# Patient Record
Sex: FEMALE | Race: WHITE | NOT HISPANIC OR LATINO | Employment: OTHER | URBAN - METROPOLITAN AREA
[De-identification: names, ages, dates, MRNs, and addresses within clinical notes are randomized per-mention and may not be internally consistent; named-entity substitution may affect disease eponyms.]

---

## 2017-01-23 DIAGNOSIS — M35.00 SJOGREN'S DISEASE: ICD-10-CM

## 2017-01-23 RX ORDER — HYDROXYCHLOROQUINE SULFATE 200 MG/1
TABLET, FILM COATED ORAL
Qty: 60 TABLET | Refills: 0 | Status: SHIPPED | OUTPATIENT
Start: 2017-01-23 | End: 2017-05-16 | Stop reason: SDUPTHER

## 2017-01-23 RX ORDER — GABAPENTIN 100 MG/1
CAPSULE ORAL
Qty: 90 CAPSULE | Refills: 0 | Status: SHIPPED | OUTPATIENT
Start: 2017-01-23 | End: 2017-03-08 | Stop reason: SDUPTHER

## 2017-03-09 RX ORDER — GABAPENTIN 100 MG/1
100 CAPSULE ORAL 3 TIMES DAILY
Qty: 90 CAPSULE | Refills: 6 | Status: SHIPPED | OUTPATIENT
Start: 2017-03-09 | End: 2017-11-08 | Stop reason: SDUPTHER

## 2017-03-29 ENCOUNTER — OFFICE VISIT (OUTPATIENT)
Dept: PODIATRY | Facility: CLINIC | Age: 62
End: 2017-03-29
Payer: COMMERCIAL

## 2017-03-29 VITALS — BODY MASS INDEX: 40 KG/M2 | HEIGHT: 61 IN | WEIGHT: 211.88 LBS

## 2017-03-29 DIAGNOSIS — L60.3 NAIL DYSTROPHY: ICD-10-CM

## 2017-03-29 DIAGNOSIS — M21.6X9 EQUINUS DEFORMITY OF FOOT: ICD-10-CM

## 2017-03-29 DIAGNOSIS — M72.2 PLANTAR FASCIITIS: Primary | ICD-10-CM

## 2017-03-29 PROCEDURE — 87102 FUNGUS ISOLATION CULTURE: CPT

## 2017-03-29 PROCEDURE — 99999 PR PBB SHADOW E&M-EST. PATIENT-LVL III: CPT | Mod: PBBFAC,,, | Performed by: PODIATRIST

## 2017-03-29 PROCEDURE — 99204 OFFICE O/P NEW MOD 45 MIN: CPT | Mod: S$GLB,,, | Performed by: PODIATRIST

## 2017-03-29 PROCEDURE — 1160F RVW MEDS BY RX/DR IN RCRD: CPT | Mod: S$GLB,,, | Performed by: PODIATRIST

## 2017-03-29 NOTE — PROGRESS NOTES
Subjective:      Patient ID: Nesha Felix is a 61 y.o. female.    Chief Complaint: Heel Pain (x 2 weeks Rt. foot) and Nail Problem (fungus)  Patient presents to clinic with the chief complaint of Rt. Heel pain with an onset of symptoms x 3 months.  States the heel as been more symptomatic the past two weeks.  Describes pain as throbbing and currently rates pain as a 4/10.  States symptoms are exacerbated with prolonged standing and initial weight bearing.  Symptoms are alleviated partially alleviated with rest.  Has been icing, stretching, and wearing supportive shoes with no relief.  Denies trauma to the affected heel.  Also, relates concern regarding discoloration of several toenails of bilateral lower extremity. Denies experiencing pain from the nails.  Inquires as to possible treatment options for fungal infection.  Denies any additional pedal complaints.      Past Medical History:   Diagnosis Date    Acid reflux     Arthritis     Cancer 2012    surgery 4/2012 most recent check 4/2016 clear.        Past Surgical History:   Procedure Laterality Date    APPENDECTOMY      BREAST SURGERY      CATARACT EXTRACTION  2015       Family History   Problem Relation Age of Onset    Cancer Mother     Heart disease Father     No Known Problems Sister     No Known Problems Brother     No Known Problems Daughter     No Known Problems Son     Cancer Maternal Aunt     Heart disease Maternal Aunt     Rheum arthritis Paternal Aunt     No Known Problems Sister     No Known Problems Sister     Osteoarthritis Sister     Lupus Sister        Social History     Social History    Marital status:      Spouse name: N/A    Number of children: N/A    Years of education: N/A     Social History Main Topics    Smoking status: Never Smoker    Smokeless tobacco: None    Alcohol use None      Comment: wine once a week    Drug use: No    Sexual activity: Not Currently     Other Topics Concern    None      Social History Narrative       Current Outpatient Prescriptions   Medication Sig Dispense Refill    CRESTOR 20 mg tablet TK 1 T PO QD  0    gabapentin (NEURONTIN) 100 MG capsule Take 1 capsule (100 mg total) by mouth 3 (three) times daily. 90 capsule 6    ibuprofen (ADVIL,MOTRIN) 800 MG tablet TK 1 T PO  TID  3    furosemide (LASIX) 20 MG tablet 1 tablet once daily.  1    hydroxychloroquine (PLAQUENIL) 200 mg tablet TAKE 1 TABLET BY MOUTH TWICE DAILY 60 tablet 0    hydrOXYzine HCl (ATARAX) 10 MG Tab Take 3 tablets (30 mg total) by mouth 2 (two) times daily as needed. May cause drowsiness. 30 tablet 2    omeprazole (PRILOSEC) 40 MG capsule TK 1 C PO QD  2     No current facility-administered medications for this visit.        Review of patient's allergies indicates:   Allergen Reactions    Estrace [estradiol] Rash     Facial rash    Lipitor [atorvastatin] Rash     Facial rash         Review of Systems   Constitution: Negative for chills and fever.   Cardiovascular: Positive for leg swelling. Negative for claudication.   Skin: Positive for color change, dry skin and nail changes.   Musculoskeletal: Positive for joint swelling.   Neurological: Negative for numbness and paresthesias.   Psychiatric/Behavioral: Negative for altered mental status.           Objective:      Physical Exam   Constitutional: She is oriented to person, place, and time. She appears well-developed and well-nourished. No distress.   Cardiovascular:   Pulses:       Dorsalis pedis pulses are 2+ on the right side, and 2+ on the left side.        Posterior tibial pulses are 2+ on the right side, and 2+ on the left side.   CFT <3 seconds bilateral.  Pedal hair growth present bilateral.  Varicosities noted bilateral.  Mild nonpitting edema noted bilateral.  Toes are warm to touch bilateral.    Musculoskeletal: She exhibits edema and tenderness.   Muscle strength 5/5 in all muscle groups bilateral.  No tenderness nor crepitation with ROM of  foot/ankle joints bilateral.  Pain with palpation of the Rt. Medial calcaneal tubercle.  (-) for pain with medial and lateral compression of the heel.  Bilateral pes cavus foot type.  Bilateral gastrocnemius equinus.  Semi-reducible contracture of toes 2-5 on the Rt and toes 3-5 on the Lt.  Rectus alignment of toes 1-2 on the Lt.    Neurological: She is alert and oriented to person, place, and time. She has normal strength. No sensory deficit.   Light touch intact bilateral.   Skin: Skin is warm, dry and intact. No abrasion, no bruising, no burn, no ecchymosis, no laceration, no lesion, no petechiae and no rash noted. She is not diaphoretic. No cyanosis or erythema. No pallor. Nails show no clubbing.   Pedal skin has normal turgor, temperature, and texture bilateral.  Bilateral hallux and Rt. 2nd toenail appear slightly thickened, and discolored with no evidence of subungual debris.  Superficial white discoloration noted to the nail plate.  Remaining toenails x 7 appear normotrophic. Examination of the skin reveals no evidence of significant maceration, rashes, open lesions, suspicious appearing nevi or other concerning lesions.              Assessment:       Encounter Diagnoses   Name Primary?    Plantar fasciitis Yes    Equinus deformity of foot     Nail dystrophy          Plan:       Nesha was seen today for heel pain and nail problem.    Diagnoses and all orders for this visit:    Plantar fasciitis  -     Ambulatory consult to Physical Therapy    Equinus deformity of foot  -     Ambulatory consult to Physical Therapy    Nail dystrophy  -     CULTURE, FUNGUS      I counseled the patient on her conditions, their implications and medical management.    With the patient's verbal consent, a sterile nail nipper was used to obtain samples of bilateral hallux nail plate without incident.  Patient tolerated this quite well.    Orders written for toenail culture to determine if fungal elements are present.    Will  consider new line of treatment for possible fungal infection should cultures be positive.    Advised to continue performing stretching exercises daily to reduce bilateral equinus.    Recommended wearing supportive shoes and OTC insoles to offload the affected heel.    Advised to rest and ice the affected heel up to 20 minutes daily.    Discussed avoidance of barefoot walking, flats, and slippers as this will exacerbate symptoms.    Discussed avoidance of squatting, stooping, and running as these activities will exacerbate symptoms.      Referral written for Physical Therapy for possible Astym of the heel.    RTC in 6 weeks for follow up.    Phani Garvey DPM

## 2017-03-29 NOTE — PATIENT INSTRUCTIONS
- Given verbal and written instructions regarding stretching exercises to help reduce equinus deformity.    - Sof Sole Fit Series High Arch (found on [employer]).     - Recommended wearing supportive shoes and insoles to help provide better medial longitudinal arch support and to offload the affected heel.    - Rest and ice the affected heel daily.    - Avoid barefoot walking, flats, and slippers as this will exacerbate symptoms.    - Avoid squatting, stooping, and running as these activities will exacerbate symptoms.

## 2017-03-29 NOTE — MR AVS SNAPSHOT
"    [facility]  [address] [facility] [address]  [place] [address]  Phone: [phone]                  Nesha Felix   3/29/2017 2:20 PM   Office Visit    Description:  Female : 1955   Provider:  Phani Garvey DPM   Department:  [facility]           Reason for Visit     Heel Pain     Nail Problem           Diagnoses this Visit        Comments    Plantar fasciitis    -  Primary     Equinus deformity of foot                To Do List           Future Appointments        Provider Department Dept Phone    5/10/2017 3:20 PM Phani Garvey DPM [facility] [phone]      Goals (5 Years of Data)     None      Follow-Up and Disposition     Return in about 6 weeks (around 5/10/2017).      [facility]     [facility] On [facility] Nurse Care Line -  Assistance  Registered nurses in the [facility] provide clinical advisement, health education, appointment booking, and other advisory services.  Call for this free service at [phone].             Medications           STOP taking these medications     VICTOZA 3-DENNIS 0.6 mg/0.1 mL (18 mg/3 mL) PnIj ADM 1.8 MG SC QD    BD ULTRA-FINE LASHAUN PEN NEEDLES 32 gauge x 5/32" Ndle U ONCE D TO INJECT VICTOZA           Verify that the below list of medications is an accurate representation of the medications you are currently taking.  If none reported, the list may be blank. If incorrect, please contact your healthcare provider. Carry this list with you in case of emergency.           Current Medications     CRESTOR 20 mg tablet TK 1 T PO QD    gabapentin (NEURONTIN) 100 MG capsule Take 1 capsule (100 mg total) by mouth 3 (three) times daily.    ibuprofen (ADVIL,MOTRIN) 800 MG tablet TK 1 T PO  TID    furosemide (LASIX) 20 MG tablet 1 tablet once daily.    hydroxychloroquine (PLAQUENIL) 200 mg tablet TAKE 1 TABLET BY MOUTH TWICE DAILY    hydrOXYzine HCl (ATARAX) 10 MG Tab Take 3 tablets (30 mg total) by mouth 2 (two) times daily as " "needed. May cause drowsiness.    omeprazole (PRILOSEC) 40 MG capsule TK 1 C PO QD           Clinical Reference Information           Your Vitals Were     Height Weight BMI          5' 1" (1.549 m) 96.1 kg (211 lb 13.8 oz) 40.03 kg/m2        Allergies as of 3/29/2017     Estrace [Estradiol]    Lipitor [Atorvastatin]      Immunizations Administered on Date of Encounter - 3/29/2017     None      Orders Placed During Today's Visit      Normal Orders This Visit    Ambulatory consult to Physical Therapy       Instructions    - Given verbal and written instructions regarding stretching exercises to help reduce equinus deformity.    - Sof Sole Fit Series High Arch (found on [employer]).     - Recommended wearing supportive shoes and insoles to help provide better medial longitudinal arch support and to offload the affected heel.    - Rest and ice the affected heel daily.    - Avoid barefoot walking, flats, and slippers as this will exacerbate symptoms.    - Avoid squatting, stooping, and running as these activities will exacerbate symptoms.           Language Assistance Services     ATTENTION: Language assistance services are available, free of charge. Please call [phone].      ATENCIÓN: Si habla neil, tiene a bravo disposición servicios gratuitos de asistencia lingüística. Llame al [phone].     ТАТЬЯНА Ý: N?u b?n nói Ti?ng Vi?t, có các d?ch v? h? tr? ngôn ng? mi?n phí dành cho b?n. G?i s? [phone].         [facility] complies with applicable Federal civil rights laws and does not discriminate on the basis of race, color, national origin, age, disability, or sex.        "

## 2017-04-05 ENCOUNTER — CLINICAL SUPPORT (OUTPATIENT)
Dept: REHABILITATION | Facility: HOSPITAL | Age: 62
End: 2017-04-05
Attending: PODIATRIST
Payer: COMMERCIAL

## 2017-04-05 DIAGNOSIS — M72.2 PLANTAR FASCIITIS: ICD-10-CM

## 2017-04-05 DIAGNOSIS — M21.6X9 EQUINUS DEFORMITY OF FOOT: ICD-10-CM

## 2017-04-05 PROCEDURE — 97161 PT EVAL LOW COMPLEX 20 MIN: CPT | Mod: PN

## 2017-04-05 PROCEDURE — 97140 MANUAL THERAPY 1/> REGIONS: CPT | Mod: PN

## 2017-04-05 NOTE — PROGRESS NOTES
Physical Therapy Evaluation    Name: Nesha [facility] Number: [id number]        Diagnosis:   Encounter Diagnoses   Name Primary?    Plantar fasciitis     Equinus deformity of foot      Physician: Phani Garvey DPM  Treatment Orders: PT Eval and Treat    Past Medical History:   Diagnosis Date    Acid reflux     Arthritis     Cancer 2012    surgery 4/2012 most recent check 4/2016 clear.      Current Outpatient Prescriptions   Medication Sig    CRESTOR 20 mg tablet TK 1 T PO QD    furosemide (LASIX) 20 MG tablet 1 tablet once daily.    gabapentin (NEURONTIN) 100 MG capsule Take 1 capsule (100 mg total) by mouth 3 (three) times daily.    hydroxychloroquine (PLAQUENIL) 200 mg tablet TAKE 1 TABLET BY MOUTH TWICE DAILY    hydrOXYzine HCl (ATARAX) 10 MG Tab Take 3 tablets (30 mg total) by mouth 2 (two) times daily as needed. May cause drowsiness.    ibuprofen (ADVIL,MOTRIN) 800 MG tablet TK 1 T PO  TID    omeprazole (PRILOSEC) 40 MG capsule TK 1 C PO QD     No current facility-administered medications for this visit.      Review of patient's allergies indicates:   Allergen Reactions    Estrace [estradiol] Rash     Facial rash    Lipitor [atorvastatin] Rash     Facial rash     Precautions: bladder CA 2012    Evaluation Date: 4/5/17      Subjective       Onset Date: R heel pain since October 2017 after R foot sx and wearing CAM boot.  She states the night splint helps.  Sx in 10/2016 to R foot and sx in 12/2016 to L foot at [facility]  Pt also reports new onset of sciatica pain    Prior Level of Function: independent with all ADLs  Social History: pt lives in 1 story house with 1 step to enter  Occupation: office work (sitting at computer)      History of Present Illness: Nesha is a 61 y.o. female that presents to [facility] secondary to R heel pain.      Pain: current 2/10, worst 10/10, best 2/10, Throbbing, intermittent    Aggravating factors: initial step in AM, prolonged  standing/walking  Easing factors: night splint, stretches, Tylenol    Pts goals: be able to walk without pain        Objective     Observation: pt is pleasant and cooperative    Lower Extremity Strength  Right LE  Left LE    Knee extension: 5/5 Knee extension: 5/5   Knee flexion: 5/5 Knee flexion: 5/5   Hip flexion: 4+/5 Hip flexion: 4+/5   Hip Internal Rotation:  5/5    Hip Internal Rotation: 5/5      Hip External Rotation: 4+/5    Hip External Rotation: 4+/5      Hip extension:  4+/5 Hip extension: 4+/5   Hip abduction: 4+/5 Hip abduction: 4/5   Hip adduction: 4/5 Hip adduction 4+/5     Ankle Range of Motion: AROM   Ankle Left Right   Dorsiflexion 3  -4    Plantarflexion 67  55    Inversion 20  20    Eversion 12  5        Strength:  Ankle Left Right   Dorsiflexion 5/5 5/5   Plantarflexion 4/5 4/5   Inversion 5/5 5/5   Eversion 5/5 4+/5       Palpation: TTP to R heel and medial arch    Sensation: intact to light touch    FOTO: mobility  CMS Impairment/Limitation/Restriction for FOTO Foot Survey  Status Limitation G-Code CMS Severity Modifier  Intake 34% 66% Current Status CL - At least 60 percent but less than 80 percent  Predicted 53% 47% Goal Status+ CK - At least 40 percent but less than 60 percent      PT Evaluation Completed? Yes  Discussed Plan of Care with patient: Yes    TREATMENT:  Nesha instructed in and performed therapeutic exercises to develop strength and endurance, flexibility for 10 minutes including:  gastroc stretch stand 3x20 sec  Soleus stretch stand 3x20 sec    Nesha  received the following manual therapy techniques x 5 min to R heel and plantar surface of foot    Pt rec'd kinesiotaping to R foot for fascia correction (platar fascia) 100% tension  Pt educated on precautions, wear time and proper tape removal.  Pt gave verbal understanding.       HEP provided: pt given verbal and written instruction for all ex listed above.  Pt educated on stretching at work and ice massage at end of  day.  Pt gave verbal understanding.  Instructed pt. Regarding: Proper technique with all exercises. Pt demo good understanding of the education provided. Nesha demonstrated good return demonstration of activities.        Assessment     This is a 61 y.o. female referred to outpatient physical therapy and presents with a medical diagnosis of   Plantar fasciitis   Equinus deformity of foot    and demonstrates limitations as described in the problem list.  Pt will benefit from physcial therapy services in order to maximize pain free and/or functional use of right foot. The following goals were discussed with the patient and patient is in agreement with them as to be addressed in the treatment plan.     History  Co-morbidities and personal factors that may impact the plan of care Examination  Body Structures and Functions, activity limitations and participation restrictions that may impact the plan of care Clinical Presentation   Decision Making/ Complexity Score   Co-morbidities:     Sx in 10/2016 to R foot and sx in 12/2016 to L foot            Personal Factors:    Body Regions: R foot, R LE    Body Systems:     Decreased R ankle ROM  Decreased B LE strength        Activity limitations:   Standing, walking    Participation Restrictions:          stable low       Medical necessity is demonstrated by the following IMPAIRMENTS/PROBLEM LIST:     1)Increase in R heel pain level limiting function   2)decreased R ankle ROM   3)decreased LE strength   4)gait abnormality   5)Lack of HEP    Anticipated Barriers for physical therapy: R sciatica symptoms    GOALS: Short Term Goals:  4 weeks  1.Report decreased    R heel    pain  <   / =  5  /10  to increase tolerance for walking  2. Increased R ankle DF AROM to 0 deg for increased ease with ambulation  3. Increased B LE strength by 1/3 muscle grade in all deficient planes  to increase tolerance for ADL and work activities.  4. Pt to report compliance with shoe inserts and ice  massage to R heel  5. Pt to tolerate HEP to improve ROM and independence with ADL's    Long Term Goals: 8 weeks  1.Report decreased    R heel    pain  <   / =  2  /10  to increase tolerance for walking  2. Increased R ankle DF AROM to 2 deg for increased ease with ambulation  3. Increased B LE strength by 1 muscle grade in all deficient planes  to increase tolerance for ADL and work activities.  4. 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 Pt to tolerate HEP to improve ROM and independence with ADL's      Plan     Pt will be treated by physical therapy 1-3 times a week for 8 weeks for Pt Education, HEP, therapeutic exercises, neuromuscular re-education, joint mobilizations, modalities prn to achieve established goals. Nesha may at times be seen by a PTA as part of the Rehab Team.     Cont PT for  8   weeks.     I certify the need for these services furnished under this plan of treatment and while under my care.______________________________ Physician/Referring Practitioner  Date of Signature

## 2017-04-05 NOTE — PLAN OF CARE
Physical Therapy Evaluation    Name: [facility] Number: [id number]        Diagnosis:   Encounter Diagnoses   Name Primary?    Plantar fasciitis     Equinus deformity of foot      Physician: Phani Garvey DPM  Treatment Orders: PT Eval and Treat    Past Medical History:   Diagnosis Date    Acid reflux     Arthritis     Cancer 2012    surgery 4/2012 most recent check 4/2016 clear.      Current Outpatient Prescriptions   Medication Sig    CRESTOR 20 mg tablet TK 1 T PO QD    furosemide (LASIX) 20 MG tablet 1 tablet once daily.    gabapentin (NEURONTIN) 100 MG capsule Take 1 capsule (100 mg total) by mouth 3 (three) times daily.    hydroxychloroquine (PLAQUENIL) 200 mg tablet TAKE 1 TABLET BY MOUTH TWICE DAILY    hydrOXYzine HCl (ATARAX) 10 MG Tab Take 3 tablets (30 mg total) by mouth 2 (two) times daily as needed. May cause drowsiness.    ibuprofen (ADVIL,MOTRIN) 800 MG tablet TK 1 T PO  TID    omeprazole (PRILOSEC) 40 MG capsule TK 1 C PO QD     No current facility-administered medications for this visit.      Review of patient's allergies indicates:   Allergen Reactions    Estrace [estradiol] Rash     Facial rash    Lipitor [atorvastatin] Rash     Facial rash     Precautions: bladder CA 2012    Evaluation Date: 4/5/17      Subjective       Onset Date: R heel pain since October 2017 after R foot sx and wearing CAM boot.  She states the night splint helps.  Sx in 10/2016 to R foot and sx in 12/2016 to L foot at [facility]  Pt also reports new onset of sciatica pain    Prior Level of Function: independent with all ADLs  Social History: pt lives in 1 story house with 1 step to enter  Occupation: office work (sitting at computer)      History of Present Illness: Nesha is a 61 y.o. female that presents to [facility] secondary to R heel pain.      Pain: current 2/10, worst 10/10, best 2/10, Throbbing, intermittent    Aggravating factors: initial step in AM, prolonged  standing/walking  Easing factors: night splint, stretches, Tylenol    Pts goals: be able to walk without pain        Objective     Observation: pt is pleasant and cooperative    Lower Extremity Strength  Right LE  Left LE    Knee extension: 5/5 Knee extension: 5/5   Knee flexion: 5/5 Knee flexion: 5/5   Hip flexion: 4+/5 Hip flexion: 4+/5   Hip Internal Rotation:  5/5    Hip Internal Rotation: 5/5      Hip External Rotation: 4+/5    Hip External Rotation: 4+/5      Hip extension:  4+/5 Hip extension: 4+/5   Hip abduction: 4+/5 Hip abduction: 4/5   Hip adduction: 4/5 Hip adduction 4+/5     Ankle Range of Motion: AROM   Ankle Left Right   Dorsiflexion 3  -4    Plantarflexion 67  55    Inversion 20  20    Eversion 12  5        Strength:  Ankle Left Right   Dorsiflexion 5/5 5/5   Plantarflexion 4/5 4/5   Inversion 5/5 5/5   Eversion 5/5 4+/5       Palpation: TTP to R heel and medial arch    Sensation: intact to light touch    FOTO: mobility  CMS Impairment/Limitation/Restriction for FOTO Foot Survey  Status Limitation G-Code CMS Severity Modifier  Intake 34% 66% Current Status CL - At least 60 percent but less than 80 percent  Predicted 53% 47% Goal Status+ CK - At least 40 percent but less than 60 percent      PT Evaluation Completed? Yes  Discussed Plan of Care with patient: Yes    TREATMENT:  Nesha instructed in and performed therapeutic exercises to develop strength and endurance, flexibility for 10 minutes including:  gastroc stretch stand 3x20 sec  Soleus stretch stand 3x20 sec    Nesha  received the following manual therapy techniques x 5 min to R heel and plantar surface of foot    Pt rec'd kinesiotaping to R foot for fascia correction (platar fascia) 100% tension  Pt educated on precautions, wear time and proper tape removal.  Pt gave verbal understanding.       HEP provided: pt given verbal and written instruction for all ex listed above.  Pt educated on stretching at work and ice massage at end of  day.  Pt gave verbal understanding.  Instructed pt. Regarding: Proper technique with all exercises. Pt demo good understanding of the education provided. Nesha demonstrated good return demonstration of activities.        Assessment     This is a 61 y.o. female referred to outpatient physical therapy and presents with a medical diagnosis of   Plantar fasciitis   Equinus deformity of foot    and demonstrates limitations as described in the problem list.  Pt will benefit from physcial therapy services in order to maximize pain free and/or functional use of right foot. The following goals were discussed with the patient and patient is in agreement with them as to be addressed in the treatment plan.     History  Co-morbidities and personal factors that may impact the plan of care Examination  Body Structures and Functions, activity limitations and participation restrictions that may impact the plan of care Clinical Presentation   Decision Making/ Complexity Score   Co-morbidities:     Sx in 10/2016 to R foot and sx in 12/2016 to L foot            Personal Factors:    Body Regions: R foot, R LE    Body Systems:     Decreased R ankle ROM  Decreased B LE strength        Activity limitations:   Standing, walking    Participation Restrictions:          stable low       Medical necessity is demonstrated by the following IMPAIRMENTS/PROBLEM LIST:     1)Increase in R heel pain level limiting function   2)decreased R ankle ROM   3)decreased LE strength   4)gait abnormality   5)Lack of HEP    Anticipated Barriers for physical therapy: R sciatica symptoms    GOALS: Short Term Goals:  4 weeks  1.Report decreased    R heel    pain  <   / =  5  /10  to increase tolerance for walking  2. Increased R ankle DF AROM to 0 deg for increased ease with ambulation  3. Increased B LE strength by 1/3 muscle grade in all deficient planes  to increase tolerance for ADL and work activities.  4. Pt to report compliance with shoe inserts and ice  massage to R heel  5. Pt to tolerate HEP to improve ROM and independence with ADL's    Long Term Goals: 8 weeks  1.Report decreased    R heel    pain  <   / =  2  /10  to increase tolerance for walking  2. Increased R ankle DF AROM to 2 deg for increased ease with ambulation  3. Increased B LE strength by 1 muscle grade in all deficient planes  to increase tolerance for ADL and work activities.  4. 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 Pt to tolerate HEP to improve ROM and independence with ADL's      Plan     Pt will be treated by physical therapy 1-3 times a week for 8 weeks for Pt Education, HEP, therapeutic exercises, neuromuscular re-education, joint mobilizations, modalities prn to achieve established goals. Nesha may at times be seen by a PTA as part of the Rehab Team.     Cont PT for  8   weeks.     I certify the need for these services furnished under this plan of treatment and while under my care.______________________________ Physician/Referring Practitioner  Date of Signature

## 2017-04-10 ENCOUNTER — CLINICAL SUPPORT (OUTPATIENT)
Dept: REHABILITATION | Facility: HOSPITAL | Age: 62
End: 2017-04-10
Attending: PODIATRIST
Payer: COMMERCIAL

## 2017-04-10 DIAGNOSIS — M72.2 PLANTAR FASCIITIS: ICD-10-CM

## 2017-04-10 DIAGNOSIS — M21.6X9 EQUINUS DEFORMITY OF FOOT: ICD-10-CM

## 2017-04-10 PROCEDURE — 97110 THERAPEUTIC EXERCISES: CPT | Mod: PN

## 2017-04-10 NOTE — PROGRESS NOTES
Name: Nesha Bhagat [facility] Number: [id number]  Date of Treatment: 04/10/2017   Diagnosis:   Encounter Diagnoses   Name Primary?    Plantar fasciitis     Equinus deformity of foot        Time in: 4:10  Time Out: 5:00  Total Treatment Time: 35      Subjective:    Nesha reports improvement of symptoms.  Patient reports her R foot pain to be 0/10 on a 0-10 scale with 0 being no pain and 10 being the worst pain imaginable.  She states she thinks the kinesiotaping helped decreased her R foot pain.  She states she has been taking a steroid pack for her back and has not had any pain since.    Objective    Nesha instructed in therapeutic exercises to develop strength, endurance, ROM and flexibility for 35 minutes including:   Recumbent bike x10 min level 1  gastroc stretch stand 3x20 sec  Soleus stretch incline 3x20 sec  SLR x10  Hip abd SL x10  Hip add SL x10  Hip ext prone x10  R piriformis stretch supine 3x20 sec    Pt rec'd kinesiotaping to R foot for fascia correction (platar fascia) 100% tension  Pt educated on precautions, wear time and proper tape removal.  Pt gave verbal understanding.  Written Home Exercises Provided: pt instructed to continue previous HEP  Pt demo good understanding of the education provided. Nesha demonstrated good return demonstration of activities.     Assessment:   Pt tolerated tx session well reporting 0/10 pain post tx.  She will continue to benefit from KT taping for plantar fascitis.    Pt will continue to benefit from skilled PT intervention. Medical Necessity is demonstrated by:  Pain limits function of effected part for some activities, Unable to participate fully in daily activities, Requires skilled supervision to complete and progress HEP and Weakness.    Patient is making good progress towards established goals.      Plan:  Continue with established Plan of Care towards PT goals. Perform eval for low back pain next visit.

## 2017-04-12 ENCOUNTER — CLINICAL SUPPORT (OUTPATIENT)
Dept: REHABILITATION | Facility: HOSPITAL | Age: 62
End: 2017-04-12
Attending: PODIATRIST
Payer: COMMERCIAL

## 2017-04-12 DIAGNOSIS — M21.6X9 EQUINUS DEFORMITY OF FOOT: ICD-10-CM

## 2017-04-12 DIAGNOSIS — M72.2 PLANTAR FASCIITIS: ICD-10-CM

## 2017-04-12 PROCEDURE — 97164 PT RE-EVAL EST PLAN CARE: CPT | Mod: PN

## 2017-04-12 PROCEDURE — 97110 THERAPEUTIC EXERCISES: CPT | Mod: PN

## 2017-04-12 NOTE — PROGRESS NOTES
Name: Nesha Bhagat [facility] Number: [id number]  Date of Treatment: 04/12/2017   Diagnosis:   Encounter Diagnoses   Name Primary?    Plantar fasciitis     Equinus deformity of foot        Time in: 4:10  Time Out: 5:00  Total Treatment Time: 35       Subjective: Pt with new PT order for low back pain    Nesha reports improvement of symptoms.  Patient reports her R foot pain to be 0/10 on a 0-10 scale with 0 being no pain and 10 being the worst pain imaginable.  She states she thinks the kinesiotaping helped decreased her R foot pain.  She states she has been taking a steroid pack for her back and has not had any pain since.    Objective  Lumbar     Subjective   Onset/EMILI: pt reports pain to low back for years, pain to lateral R thigh and groin  Primary concern/ Chief complaints: pain to R low back and down lateral R thigh and groin    Nesha is a 61 y.o. female that presents to [facility] outpatient physical therapy secondary to low back and R LE.    Previous treatment included injections and steroid pack. Pt works: office work (sitting at desk). Pt denies numbness and tingling to LE. No cultural, environmental, or spiritual barriers identified to treatment or learning.      Pain Scale: Nesha rates pain on a scale of 0-10 to be 10 at worst; 4 currently; 0 at best .  Increased pain: standing, prolonged sitting, exercise  Decreased pain: steroids    Patient Goals: Pt would like to decrease pain and increase function so she can return to her normal daily activities.        Objective     Observation: pt in apparent pain    Posture:  Fwd head, rounded     Lumbar Range of Motion:    % Pain   Flexion 100   no        Extension 90   no        Left Side Bending 80 Tight to R side        Right Side Bending 90 no        Left rotation   75 Tight on L        Right Rotation   75 no             Neuro Dynamic Testing:    Sciatic nerve:      SLR: - bilaterally        Joint Mobility: pain with PA to L4 and L5 with slight  decreased motion and muscle guarding noted     Palpation: TTP to B lumbar paraspinals and R lateral piriformis, TTP to TVP of L4 and L5    Sensation: intact to light touch    Flexibility:    Ely's test: R = 90 degrees ; L = 100 degrees   Popliteal Angle: R = -5 degrees ; L = -10 degrees     Functional Limitations Reports - G Codes  Category: mobility  CMS Impairment/Limitation/Restriction for FOTO Lumbar Spine Survey  Status Limitation G-Code CMS Severity Modifier  Intake 45% 55% Current Status CK - At least 40 percent but less than 60 percent  Predicted 57% 43% Goal Status+ CK - At least 40 percent but less than 60 percent      TREATMENT:  Nesha instructed in and performed therapeutic exercises to develop strength and endurance, flexibility for 30 minutes including:  SKTC 3x20 sec  LTR 3x20 sec  Piriformis stretch supine 3x20 sec  PPT with TA set 10x5 sec  Seated piriformis stretch 3x20 sec  Seated HS stretch 3x20 sec    Pt educated on proper sitting posture with use of towel for lumbar roll    Nesha  received the following manual therapy techniques x 5 min. To include laminar release to B lumbar paraspinals and instructed to continue LE HEP from previous appt.  Instructed pt. Regarding: Proper technique with all exercises. Pt demo good understanding of the education provided. Nesha demonstrated good return demonstration of activities.      GOALS: Short Term Goals:  4 weeks  1.Report decreased    Low back and R LE    pain  <   / =  4  /10  to increase tolerance for standing/walking  2. Pt to increase B popliteal angle by > or = 3 degrees in order to improve flexibility and posture.   3. Increased R LE strength by 1/3 muscle grade in all deficient planes to increase tolerance for ADL and work activities.  4. Increased L LE strength by 1/3 muscle grade in all deficient planes to increase tolerance for ADL and work activities.  5. Pt to increase B Ely's Test by > or = 5 degrees in order to improve  flexibility and posture.   6. Pt to tolerate HEP to improve ROM and independence with ADL's  7. Pt will report ability to walk x10 min without exacerbation of back pain.      Long Term Goals: 8 weeks  1.Report decreased    Low back and R LE    pain  <   / =  2  /10  to increase tolerance for standing/walking  2. Pt to increase B popliteal angle by > or = 4 degrees in order to improve flexibility and posture.   3. Increased R LE strength by 1 muscle grade in all deficient planes to increase tolerance for ADL and work activities.  4. Increased L LE strength by 1 muscle grade in all deficient planes to increase tolerance for ADL and work activities.  5. Pt to increase B Ely's Test by > or =  10degrees in order to improve flexibility and posture.   6. Pt will demonstrate understanding of proper body mechanics for lifting objects to/from waist.      Plan     Pt will be treated by physical therapy 1-3 times a week for 8 weeks for Pt Education, HEP, therapeutic exercises, neuromuscular re-education, joint mobilizations, modalities prn to achieve established goals. Nesha may at times be seen by a PTA as part of the Rehab Team.     Cont PT for 8 weeks.     I certify the need for these services furnished under this plan of treatment and while under my care.______________________________ Physician/Referring Practitioner  Date of Signature

## 2017-04-13 NOTE — PLAN OF CARE
Name: Nesha Bhagat [facility] Number: [id number]  Date of Treatment: 04/12/2017   Diagnosis:   Encounter Diagnoses   Name Primary?    Plantar fasciitis     Equinus deformity of foot        Time in: 4:10  Time Out: 5:00  Total Treatment Time: 35       Subjective: Pt with new PT order for low back pain    Nesha reports improvement of symptoms.  Patient reports her R foot pain to be 0/10 on a 0-10 scale with 0 being no pain and 10 being the worst pain imaginable.  She states she thinks the kinesiotaping helped decreased her R foot pain.  She states she has been taking a steroid pack for her back and has not had any pain since.    Objective  Lumbar     Subjective   Onset/EMILI: pt reports pain to low back for years, pain to lateral R thigh and groin  Primary concern/ Chief complaints: pain to R low back and down lateral R thigh and groin    Nesha is a 61 y.o. female that presents to [facility] outpatient physical therapy secondary to low back and R LE.    Previous treatment included injections and steroid pack. Pt works: office work (sitting at desk). Pt denies numbness and tingling to LE. No cultural, environmental, or spiritual barriers identified to treatment or learning.      Pain Scale: Nesha rates pain on a scale of 0-10 to be 10 at worst; 4 currently; 0 at best .  Increased pain: standing, prolonged sitting, exercise  Decreased pain: steroids    Patient Goals: Pt would like to decrease pain and increase function so she can return to her normal daily activities.        Objective     Observation: pt in apparent pain    Posture:  Fwd head, rounded     Lumbar Range of Motion:    % Pain   Flexion 100   no        Extension 90   no        Left Side Bending 80 Tight to R side        Right Side Bending 90 no        Left rotation   75 Tight on L        Right Rotation   75 no             Neuro Dynamic Testing:    Sciatic nerve:      SLR: - bilaterally        Joint Mobility: pain with PA to L4 and L5 with slight  decreased motion and muscle guarding noted     Palpation: TTP to B lumbar paraspinals and R lateral piriformis, TTP to TVP of L4 and L5    Sensation: intact to light touch    Flexibility:    Ely's test: R = 90 degrees ; L = 100 degrees   Popliteal Angle: R = -5 degrees ; L = -10 degrees     Functional Limitations Reports - G Codes  Category: mobility  CMS Impairment/Limitation/Restriction for FOTO Lumbar Spine Survey  Status Limitation G-Code CMS Severity Modifier  Intake 45% 55% Current Status CK - At least 40 percent but less than 60 percent  Predicted 57% 43% Goal Status+ CK - At least 40 percent but less than 60 percent      TREATMENT:  Nesha instructed in and performed therapeutic exercises to develop strength and endurance, flexibility for 30 minutes including:  SKTC 3x20 sec  LTR 3x20 sec  Piriformis stretch supine 3x20 sec  PPT with TA set 10x5 sec  Seated piriformis stretch 3x20 sec  Seated HS stretch 3x20 sec    Pt educated on proper sitting posture with use of towel for lumbar roll    Nesha  received the following manual therapy techniques x 5 min. To include laminar release to B lumbar paraspinals and instructed to continue LE HEP from previous appt.  Instructed pt. Regarding: Proper technique with all exercises. Pt demo good understanding of the education provided. Nesha demonstrated good return demonstration of activities.      GOALS: Short Term Goals:  4 weeks  1.Report decreased    Low back and R LE    pain  <   / =  4  /10  to increase tolerance for standing/walking  2. Pt to increase B popliteal angle by > or = 3 degrees in order to improve flexibility and posture.   3. Increased R LE strength by 1/3 muscle grade in all deficient planes to increase tolerance for ADL and work activities.  4. Increased L LE strength by 1/3 muscle grade in all deficient planes to increase tolerance for ADL and work activities.  5. Pt to increase B Ely's Test by > or = 5 degrees in order to improve  flexibility and posture.   6. Pt to tolerate HEP to improve ROM and independence with ADL's  7. Pt will report ability to walk x10 min without exacerbation of back pain.      Long Term Goals: 8 weeks  1.Report decreased    Low back and R LE    pain  <   / =  2  /10  to increase tolerance for standing/walking  2. Pt to increase B popliteal angle by > or = 4 degrees in order to improve flexibility and posture.   3. Increased R LE strength by 1 muscle grade in all deficient planes to increase tolerance for ADL and work activities.  4. Increased L LE strength by 1 muscle grade in all deficient planes to increase tolerance for ADL and work activities.  5. Pt to increase B Ely's Test by > or =  10degrees in order to improve flexibility and posture.   6. Pt will demonstrate understanding of proper body mechanics for lifting objects to/from waist.      Plan     Pt will be treated by physical therapy 1-3 times a week for 8 weeks for Pt Education, HEP, therapeutic exercises, neuromuscular re-education, joint mobilizations, modalities prn to achieve established goals. Nesha may at times be seen by a PTA as part of the Rehab Team.     Cont PT for 8 weeks.     I certify the need for these services furnished under this plan of treatment and while under my care.______________________________ Physician/Referring Practitioner  Date of Signature

## 2017-04-17 ENCOUNTER — CLINICAL SUPPORT (OUTPATIENT)
Dept: REHABILITATION | Facility: HOSPITAL | Age: 62
End: 2017-04-17
Attending: PODIATRIST
Payer: COMMERCIAL

## 2017-04-17 DIAGNOSIS — M72.2 PLANTAR FASCIITIS: ICD-10-CM

## 2017-04-17 DIAGNOSIS — M21.6X9 EQUINUS DEFORMITY OF FOOT: ICD-10-CM

## 2017-04-17 PROCEDURE — 97110 THERAPEUTIC EXERCISES: CPT | Mod: PN

## 2017-04-17 PROCEDURE — 97140 MANUAL THERAPY 1/> REGIONS: CPT | Mod: PN

## 2017-04-17 NOTE — PROGRESS NOTES
Name: Nesha Bhagat [facility] Number: [id number]  Date of Treatment: 04/17/2017   Diagnosis:   Encounter Diagnoses   Name Primary?    Plantar fasciitis     Equinus deformity of foot      Visit #: [id number]  Start Date: 04/05/17  POC End Date: 05/31/17    Time in: 4:05  Time Out: 4:58  Total Treatment Time: 50  Group Time: 20       SUBJECTIVE:     Nesha reports increased soreness in the gastroc with cramping and pain into the heel. No increased pain in the R low back. Patient reports her Low back pain at 1/10 R foot pain to be 8/10 on a 0-10 scale with 0 being no pain and 10 being the worst pain imaginable.      OBJECTIVE:    Observation: pt arrives with antalgic gait, decreased WB on R LE    TREATMENT:  Nesha instructed in and performed therapeutic exercises to develop strength and endurance, flexibility for 40 minutes (20 min 1:1) including:  SKTC 3x20 sec  LTR 3x20 sec  Piriformis stretch supine 3x20 sec  Supine nerve glide x 5 with 10 ankle pumps  PPT with TA set 30x5 sec  Seated piriformis stretch 3x20 sec  Seated HS stretch 3x20 sec  Standing B gastroc stretch on incline x 2 min    Pt rec'd kinesiotaping to R foot for fascia correction (platar fascia) 100% tension  Pt educated on precautions, wear time and proper tape removal. Pt gave verbal understanding.    Nesha  received the following manual therapy techniques x 10 min to include laminar release to B lumbar paraspinals, STM to R gastroc for decreased tightness at musculo-tendon junction.   Pt instructed to continue with current written HEP.  Instructed pt. Regarding: Proper technique with all exercises. Pt demo good understanding of the education provided. Nesha demonstrated good return demonstration of activities.    ASSESSMENT:    Pt demonstrated overall good tolerance to treatment session this date. She continues with core weakness, tightness of B hips and lumbar spine, as well as fascial tightness and tenderness of R gastroc. Tenderness at  calcaneus with heel strike during ambulation. She will benefit from continued strengthening and stretching as tolerated to improve tolerance to ADL's without exacerbation to symptoms.     GOALS: Short Term Goals:  4 weeks  1.Report decreased    Low back and R LE    pain  <   / =  4  /10  to increase tolerance for standing/walking  2. Pt to increase B popliteal angle by > or = 3 degrees in order to improve flexibility and posture.   3. Increased R LE strength by 1/3 muscle grade in all deficient planes to increase tolerance for ADL and work activities.  4. Increased L LE strength by 1/3 muscle grade in all deficient planes to increase tolerance for ADL and work activities.  5. Pt to increase B Ely's Test by > or = 5 degrees in order to improve flexibility and posture.   6. Pt to tolerate HEP to improve ROM and independence with ADL's  7. Pt will report ability to walk x10 min without exacerbation of back pain.      Long Term Goals: 8 weeks  1.Report decreased    Low back and R LE    pain  <   / =  2  /10  to increase tolerance for standing/walking  2. Pt to increase B popliteal angle by > or = 4 degrees in order to improve flexibility and posture.   3. Increased R LE strength by 1 muscle grade in all deficient planes to increase tolerance for ADL and work activities.  4. Increased L LE strength by 1 muscle grade in all deficient planes to increase tolerance for ADL and work activities.  5. Pt to increase B Ely's Test by > or =  10degrees in order to improve flexibility and posture.   6. Pt will demonstrate understanding of proper body mechanics for lifting objects to/from waist.      Plan     Pt will be treated by physical therapy 1-3 times a week for 8 weeks for Pt Education, HEP, therapeutic exercises, neuromuscular re-education, joint mobilizations, modalities prn to achieve established goals. Nesha may at times be seen by a PTA as part of the Rehab Team.     Cont PT for 8 weeks.     I certify the need for  these services furnished under this plan of treatment and while under my care.______________________________ Physician/Referring Practitioner  Date of Signature

## 2017-04-18 ENCOUNTER — PATIENT MESSAGE (OUTPATIENT)
Dept: PODIATRY | Facility: CLINIC | Age: 62
End: 2017-04-18

## 2017-04-25 ENCOUNTER — TELEPHONE (OUTPATIENT)
Dept: PODIATRY | Facility: CLINIC | Age: 62
End: 2017-04-25

## 2017-04-26 ENCOUNTER — DOCUMENTATION ONLY (OUTPATIENT)
Dept: REHABILITATION | Facility: HOSPITAL | Age: 62
End: 2017-04-26

## 2017-04-26 NOTE — PROGRESS NOTES
Name: Nesha Bhagat [facility] Number: [id number]   Age: 61 y.o.   Diagnosis:    Plantar fasciitis      Equinus deformity of foot        Physician: Phani Garvey DPM  Original Orders : PT eval and treat  Initial visit: 4/5/17  Date of Last visit: 4/17/17  Date of Discharge Note:  4/26/17  Total Visits Received: 4      Subjective: pt called to cancel all remaining appts stating She got her inserts, doing exercises, and received steriod medication. She's oing good and wants to be discharged.     Objective:  Treatment :    Included:Therapeutic exercise, kinesiotaping, manual therapy, HEP        Assessment:  Pt self discharged due to doing well and compliant with HEP and inserts.  Formal reassessment not performed due to pt cancelled remaining appts.    Discharge reason : Patient self discharge    Discharge plan :Continue HEP and Pt to follow-up with MD as planned    Plan:  This patient is discharged from [facility].

## 2017-05-03 ENCOUNTER — TELEPHONE (OUTPATIENT)
Dept: PODIATRY | Facility: CLINIC | Age: 62
End: 2017-05-03

## 2017-05-03 LAB — FUNGUS SPEC CULT: NORMAL

## 2017-05-16 DIAGNOSIS — M35.00 SJOGREN'S SYNDROME, WITH UNSPECIFIED ORGAN INVOLVEMENT: Primary | ICD-10-CM

## 2017-05-17 RX ORDER — HYDROXYCHLOROQUINE SULFATE 200 MG/1
200 TABLET, FILM COATED ORAL 2 TIMES DAILY
Qty: 60 TABLET | Refills: 2 | Status: SHIPPED | OUTPATIENT
Start: 2017-05-17 | End: 2017-09-08 | Stop reason: SDUPTHER

## 2017-06-30 ENCOUNTER — LAB VISIT (OUTPATIENT)
Dept: LAB | Facility: HOSPITAL | Age: 62
End: 2017-06-30
Attending: INTERNAL MEDICINE
Payer: COMMERCIAL

## 2017-06-30 DIAGNOSIS — M35.00 SJOGREN'S DISEASE: ICD-10-CM

## 2017-06-30 DIAGNOSIS — M35.00 SICCA SYNDROME: Primary | ICD-10-CM

## 2017-06-30 LAB
BASOPHILS # BLD AUTO: 0.02 K/UL
BASOPHILS NFR BLD: 0.4 %
CRP SERPL-MCNC: 7.5 MG/L
DIFFERENTIAL METHOD: NORMAL
EOSINOPHIL # BLD AUTO: 0.1 K/UL
EOSINOPHIL NFR BLD: 1.5 %
ERYTHROCYTE [DISTWIDTH] IN BLOOD BY AUTOMATED COUNT: 14 %
HCT VFR BLD AUTO: 40.6 %
HGB BLD-MCNC: 13.4 G/DL
LYMPHOCYTES # BLD AUTO: 1.2 K/UL
LYMPHOCYTES NFR BLD: 22 %
MCH RBC QN AUTO: 29.8 PG
MCHC RBC AUTO-ENTMCNC: 33 %
MCV RBC AUTO: 90 FL
MONOCYTES # BLD AUTO: 0.4 K/UL
MONOCYTES NFR BLD: 7.5 %
NEUTROPHILS # BLD AUTO: 3.8 K/UL
NEUTROPHILS NFR BLD: 68.1 %
PLATELET # BLD AUTO: 270 K/UL
PMV BLD AUTO: 11.3 FL
RBC # BLD AUTO: 4.49 M/UL
WBC # BLD AUTO: 5.5 K/UL

## 2017-06-30 PROCEDURE — 86038 ANTINUCLEAR ANTIBODIES: CPT

## 2017-06-30 PROCEDURE — 86039 ANTINUCLEAR ANTIBODIES (ANA): CPT

## 2017-06-30 PROCEDURE — 86235 NUCLEAR ANTIGEN ANTIBODY: CPT | Mod: 59

## 2017-06-30 PROCEDURE — 86140 C-REACTIVE PROTEIN: CPT

## 2017-06-30 PROCEDURE — 85025 COMPLETE CBC W/AUTO DIFF WBC: CPT

## 2017-06-30 PROCEDURE — 36415 COLL VENOUS BLD VENIPUNCTURE: CPT | Mod: PO

## 2017-07-03 LAB
ANA SER QL IF: POSITIVE
ANA TITR SER IF: NORMAL {TITER}

## 2017-07-06 LAB
ANTI SM ANTIBODY: 0.44 EU
ANTI SM/RNP ANTIBODY: 1.12 EU
ANTI-SM INTERPRETATION: NEGATIVE
ANTI-SM/RNP INTERPRETATION: NEGATIVE
ANTI-SSA ANTIBODY: 2.61 EU
ANTI-SSA INTERPRETATION: NEGATIVE
ANTI-SSB ANTIBODY: 0.41 EU
ANTI-SSB INTERPRETATION: NEGATIVE
DSDNA AB SER-ACNC: NORMAL [IU]/ML

## 2017-07-11 ENCOUNTER — OFFICE VISIT (OUTPATIENT)
Dept: RHEUMATOLOGY | Facility: CLINIC | Age: 62
End: 2017-07-11
Payer: COMMERCIAL

## 2017-07-11 VITALS
HEIGHT: 61 IN | HEART RATE: 68 BPM | BODY MASS INDEX: 39.08 KG/M2 | WEIGHT: 207 LBS | DIASTOLIC BLOOD PRESSURE: 64 MMHG | SYSTOLIC BLOOD PRESSURE: 110 MMHG

## 2017-07-11 DIAGNOSIS — M35.9 UNDIFFERENTIATED CONNECTIVE TISSUE DISEASE: Primary | ICD-10-CM

## 2017-07-11 DIAGNOSIS — M06.4 INFLAMMATORY POLYARTHRITIS: ICD-10-CM

## 2017-07-11 PROCEDURE — 99214 OFFICE O/P EST MOD 30 MIN: CPT | Mod: S$GLB,,, | Performed by: INTERNAL MEDICINE

## 2017-07-11 PROCEDURE — 99999 PR PBB SHADOW E&M-EST. PATIENT-LVL III: CPT | Mod: PBBFAC,,, | Performed by: INTERNAL MEDICINE

## 2017-07-11 RX ORDER — SPIRONOLACTONE 50 MG/1
TABLET, FILM COATED ORAL
Refills: 3 | COMMUNITY
Start: 2017-06-14

## 2017-07-11 RX ORDER — METHYLPREDNISOLONE 4 MG/1
TABLET ORAL
Refills: 0 | COMMUNITY
Start: 2017-04-06 | End: 2018-04-19

## 2017-07-11 RX ORDER — NAPROXEN AND ESOMEPRAZOLE MAGNESIUM 500; 20 MG/1; MG/1
TABLET, DELAYED RELEASE ORAL
Refills: 3 | COMMUNITY
Start: 2017-05-12 | End: 2021-04-01

## 2017-07-11 RX ORDER — LINACLOTIDE 145 UG/1
CAPSULE, GELATIN COATED ORAL
Refills: 5 | COMMUNITY
Start: 2017-07-02 | End: 2018-04-24

## 2017-07-11 RX ORDER — ROSUVASTATIN CALCIUM 20 MG/1
20 TABLET, COATED ORAL NIGHTLY
COMMUNITY

## 2017-07-11 NOTE — PROGRESS NOTES
Subjective:          Chief Complaint: Nesha Felix is a 62 y.o. female who had concerns including sjogrens disease.    HPI:    Patient is a 62y/o female referred by her PCP for Sjogren and Raynaud's. She Describes hx of swelling in her hands with 15-30 minutes stiffness. She notes arthralgias and myalgias that is diffuse, she notes fatigue.  She has + dry mouth and eyes mild. She notes Raynauds mild. She has rather severe pedal edema that is painful with walking. She did have serologies and repeat with direct ASHLEY +, SSA +, SSB negative, Smith negative and RNP negative.   CRP was elevated at 11.5 (<4.9). TPO negative/Thyroglobulin ab negative. Recently did have repeat ASHLEY with IF 1:160 speckled with negative ASHLEY profile on HCQ.   NO SICCA symptoms. +malar type rash.   Gabapentin for peripheral neuropathy.  She has some days with entire leg that is painful. Hx of sciatic did have PT no real help. Right mostly. DDD on older MRI of the L-spine follows with Dr. Escobar at [facility].   She       She has hx of Bladder cancer with no recurrence. More recently found to have renal mass. Will be seeing MD Velazco 10/1st and 2nd/ 2017.    Patient deneis any hx of seizure, stroke, DVT, pericarditis, pleurisy, anemia, nephritis, leukopenia. Patient does not facial redness with slight photosensitivity (fatigue) no scarring rashes.      Started HCQ as of last visit no difference with joints that she can tell. She discusses her inflammation but no specific joint swelling.    She is under significant stress,  with leukemia.   Having itching for at least 3 years. Did not see Derm but Hydroxyzine with somnolence.     FmHx: sister with SLE (CL also my patient)      Recent imaging with renal mass going to MD Velazco 10/1/16 for updated CT     Component      Latest Ref Rng & Units 6/30/2017   Anti Sm Antibody      0.00 - 19.99 EU 0.44   Anti-Sm Interpretation      Negative Negative   Anti-SSA Antibody      0.00 - 19.99 EU 2.61    Anti-SSA Interpretation      Negative Negative   Anti-SSB Antibody      0.00 - 19.99 EU 0.41   Anti-SSB Interpretation      Negative Negative   ds DNA Ab      Negative 1:10 Negative 1:10   Anti Sm/RNP Antibody      0.00 - 19.99 EU 1.12   Anti-Sm/RNP Interpretation      Negative Negative   CRP      0.0 - 8.2 mg/L 7.5   ASHLEY HEP-2 Titer       Positive 1:160 Speckled       REVIEW OF SYSTEMS:    Review of Systems   Constitutional: Positive for malaise/fatigue. Negative for fever and weight loss.   HENT: Negative for sore throat.    Eyes: Negative for double vision, photophobia and redness.   Respiratory: Negative for cough, shortness of breath and wheezing.    Cardiovascular: Positive for leg swelling. Negative for chest pain, palpitations and orthopnea.   Gastrointestinal: Negative for abdominal pain, constipation and diarrhea.   Genitourinary: Negative for dysuria, hematuria and urgency.   Musculoskeletal: Positive for joint pain and myalgias. Negative for back pain.   Skin: Negative for rash.   Neurological: Negative for dizziness, tingling, focal weakness and headaches.   Endo/Heme/Allergies: Does not bruise/bleed easily.   Psychiatric/Behavioral: Negative for depression, hallucinations and suicidal ideas.               Objective:            Past Medical History:   Diagnosis Date    Acid reflux     Arthritis     Cancer 2012    surgery 4/2012 most recent check 4/2016 clear.      Family History   Problem Relation Age of Onset    Cancer Mother     Heart disease Father     No Known Problems Sister     No Known Problems Brother     No Known Problems Daughter     No Known Problems Son     Cancer Maternal Aunt     Heart disease Maternal Aunt     Rheum arthritis Paternal Aunt     No Known Problems Sister     No Known Problems Sister     Osteoarthritis Sister     Lupus Sister      Social History   Substance Use Topics    Smoking status: Never Smoker    Smokeless tobacco: Never Used    Alcohol use Not on  file      Comment: wine once a week         Current Outpatient Prescriptions on File Prior to Visit   Medication Sig Dispense Refill    gabapentin (NEURONTIN) 100 MG capsule Take 1 capsule (100 mg total) by mouth 3 (three) times daily. 90 capsule 6    hydroxychloroquine (PLAQUENIL) 200 mg tablet Take 1 tablet (200 mg total) by mouth 2 (two) times daily. 60 tablet 2    ibuprofen (ADVIL,MOTRIN) 800 MG tablet TK 1 T PO  TID  3    omeprazole (PRILOSEC) 40 MG capsule TK 1 C PO QD  2    furosemide (LASIX) 20 MG tablet 1 tablet once daily.  1    hydrOXYzine HCl (ATARAX) 10 MG Tab Take 3 tablets (30 mg total) by mouth 2 (two) times daily as needed. May cause drowsiness. 30 tablet 2    [DISCONTINUED] CRESTOR 20 mg tablet TK 1 T PO QD  0     No current facility-administered medications on file prior to visit.        Vitals:    07/11/17 1610   BP: 110/64   Pulse: 68       Physical Exam:    Physical Exam   Constitutional: She appears well-developed and well-nourished.   HENT:   Nose: No septal deviation.   Mouth/Throat: Mucous membranes are normal. No oral lesions.   Eyes: Pupils are equal, round, and reactive to light. Right conjunctiva is not injected. Left conjunctiva is not injected.   Neck: No JVD present. No thyroid mass and no thyromegaly present.   Cardiovascular: Normal rate, regular rhythm and normal pulses.    +edema pitting LE b/l   Pulmonary/Chest: Effort normal and breath sounds normal.   Abdominal: Soft. Normal appearance. There is no hepatosplenomegaly.   Musculoskeletal:        Right shoulder: She exhibits normal range of motion, no tenderness and no swelling.        Left shoulder: She exhibits normal range of motion, no tenderness and no swelling.        Right elbow: She exhibits normal range of motion and no swelling. No tenderness found.        Left elbow: She exhibits normal range of motion and no swelling. No tenderness found.        Right wrist: She exhibits normal range of motion, no tenderness  and no swelling.        Left wrist: She exhibits normal range of motion, no tenderness and no swelling.        Right hip: She exhibits normal range of motion, normal strength and no swelling.        Left hip: She exhibits normal range of motion, no tenderness and no swelling.        Right knee: She exhibits normal range of motion and no swelling. No tenderness found.        Left knee: She exhibits normal range of motion and no swelling. No tenderness found.        Right ankle: She exhibits normal range of motion and no swelling. No tenderness.        Left ankle: She exhibits normal range of motion and no swelling. No tenderness.        Left hand: She exhibits tenderness.        Right foot: There is tenderness.        Left foot: There is tenderness.   5/5 upper and lower extremity bilateral muscle strength   Lymphadenopathy:     She has no cervical adenopathy.     She has no axillary adenopathy.   Neurological: She has normal strength and normal reflexes.   Skin: Skin is dry and intact.   Psychiatric: She has a normal mood and affect.             Assessment:       Encounter Diagnoses   Name Primary?    Undifferentiated connective tissue disease Yes    Inflammatory polyarthritis           Plan:        Undifferentiated connective tissue disease  Comments:  on HCQ doing well.     Inflammatory polyarthritis       I am not sure the etiology of her elevated CRP with normal ESR. While this can be seen with Sjogrens she has no evidence of synovitis on exam. I also want to r/o any RCC given recent MRI of renal mass.   Want to continue HCQ, but no synovitis today   Right greater trochanter/hip region I think this is referred.   No weakness on exam  Still with pruritis -excoriation on the right arms.     Return in about 3 months (around 10/11/2017).        30min consultation with greater than 50% spent in counseling, chart review and coordination of care. All questions answered.

## 2017-08-01 PROBLEM — D49.0 NEOPLASM OF UNSPECIFIED NATURE OF DIGESTIVE SYSTEM: Status: ACTIVE | Noted: 2017-08-01

## 2017-08-10 DIAGNOSIS — L29.9 PRURITUS: ICD-10-CM

## 2017-08-11 RX ORDER — HYDROXYZINE HYDROCHLORIDE 10 MG/1
TABLET, FILM COATED ORAL
Qty: 30 TABLET | Refills: 0 | Status: SHIPPED | OUTPATIENT
Start: 2017-08-11 | End: 2019-04-02

## 2017-09-08 DIAGNOSIS — M35.00 SJOGREN'S SYNDROME, WITH UNSPECIFIED ORGAN INVOLVEMENT: ICD-10-CM

## 2017-09-08 RX ORDER — HYDROXYCHLOROQUINE SULFATE 200 MG/1
TABLET, FILM COATED ORAL
Qty: 60 TABLET | Refills: 6 | Status: SHIPPED | OUTPATIENT
Start: 2017-09-08 | End: 2018-04-02 | Stop reason: SDUPTHER

## 2017-11-08 ENCOUNTER — OFFICE VISIT (OUTPATIENT)
Dept: RHEUMATOLOGY | Facility: CLINIC | Age: 62
End: 2017-11-08
Payer: COMMERCIAL

## 2017-11-08 VITALS
WEIGHT: 213.88 LBS | HEIGHT: 61 IN | DIASTOLIC BLOOD PRESSURE: 80 MMHG | SYSTOLIC BLOOD PRESSURE: 120 MMHG | HEART RATE: 64 BPM | BODY MASS INDEX: 40.38 KG/M2

## 2017-11-08 DIAGNOSIS — I73.00 RAYNAUD'S PHENOMENON WITHOUT GANGRENE: ICD-10-CM

## 2017-11-08 DIAGNOSIS — G25.81 RLS (RESTLESS LEGS SYNDROME): ICD-10-CM

## 2017-11-08 DIAGNOSIS — M35.9 UNDIFFERENTIATED CONNECTIVE TISSUE DISEASE: Primary | ICD-10-CM

## 2017-11-08 DIAGNOSIS — G60.9 IDIOPATHIC PERIPHERAL NEUROPATHY: ICD-10-CM

## 2017-11-08 PROCEDURE — 99214 OFFICE O/P EST MOD 30 MIN: CPT | Mod: S$GLB,,, | Performed by: INTERNAL MEDICINE

## 2017-11-08 PROCEDURE — 99999 PR PBB SHADOW E&M-EST. PATIENT-LVL III: CPT | Mod: PBBFAC,,, | Performed by: INTERNAL MEDICINE

## 2017-11-08 RX ORDER — PRAMIPEXOLE DIHYDROCHLORIDE 0.25 MG/1
0.25 TABLET ORAL NIGHTLY PRN
Qty: 90 TABLET | Refills: 11 | Status: SHIPPED | OUTPATIENT
Start: 2017-11-08 | End: 2018-04-19

## 2017-11-08 RX ORDER — GABAPENTIN 100 MG/1
200 CAPSULE ORAL NIGHTLY
Qty: 90 CAPSULE | Refills: 6
Start: 2017-11-08 | End: 2018-04-17

## 2017-11-08 NOTE — PROGRESS NOTES
Subjective:          Chief Complaint: Nesha Felix is a 62 y.o. female who had no chief complaint listed for this encounter.    HPI:    Patient is a 60y/o female here for f/u UCTD and Raynaud's.   She Describes hx of swelling in her hands with 15-30 minutes stiffness. She notes arthralgias and myalgias that is diffuse, she notes fatigue.  She has + dry mouth and eyes mild. She notes Raynauds mild.    She did have serologies and repeat with direct ASHLEY +, SSA +, SSB negative, Smith negative and RNP negative.   CRP was elevated at 11.5 (<4.9). TPO negative/Thyroglobulin ab negative  Having itching for at least 3 years. Did not see Derm but Hydroxyzine with somnolence.   Recently did have repeat ASHLEY with IF 1:160 speckled with negative ASHLEY profile on HCQ.   NO SICCA symptoms. +malar type rash.   Gabapentin for peripheral neuropathy/RLS  She stopped HCQ for few months and noted increased joint stiffness. Better on HCQ. Restarted.   Vimovo for arthritis using PRN    She has some days with entire leg that is painful. Hx of sciatic did have PT no real help. Right mostly. DDD on older MRI of the L-spine follows with Dr. Escobar at [facility].   She     She has hx of Bladder cancer with no recurrence. More recently found to have renal mass. Will be seeing MD Velazco 10/1st and 2nd/ 2017- monitoring for 1 year.    Patient deneis any hx of seizure, stroke, DVT, pericarditis, pleurisy, anemia, nephritis, leukopenia. Patient does not facial redness with slight photosensitivity (fatigue) no scarring rashes.   Dr. Bustamante-GI recent EUS with small lesion in stomach all benign    She is under significant stress,  with leukemia.   FmHx: sister with SLE (CL also my patient)    Component      Latest Ref Rng & Units 6/30/2017   Anti Sm Antibody      0.00 - 19.99 EU 0.44   Anti-Sm Interpretation      Negative Negative   Anti-SSA Antibody      0.00 - 19.99 EU 2.61   Anti-SSA Interpretation      Negative Negative   Anti-SSB Antibody       0.00 - 19.99 EU 0.41   Anti-SSB Interpretation      Negative Negative   ds DNA Ab      Negative 1:10 Negative 1:10   Anti Sm/RNP Antibody      0.00 - 19.99 EU 1.12   Anti-Sm/RNP Interpretation      Negative Negative   CRP      0.0 - 8.2 mg/L 7.5   ASHLEY HEP-2 Titer       Positive 1:160 Speckled       REVIEW OF SYSTEMS:    Review of Systems   Constitutional: Positive for malaise/fatigue. Negative for fever and weight loss.   HENT: Negative for sore throat.    Eyes: Negative for double vision, photophobia and redness.   Respiratory: Negative for cough, shortness of breath and wheezing.    Cardiovascular: Positive for leg swelling. Negative for chest pain, palpitations and orthopnea.   Gastrointestinal: Negative for abdominal pain, constipation and diarrhea.   Genitourinary: Negative for dysuria, hematuria and urgency.   Musculoskeletal: Positive for joint pain and myalgias. Negative for back pain.   Skin: Negative for rash.   Neurological: Negative for dizziness, tingling, focal weakness and headaches.   Endo/Heme/Allergies: Does not bruise/bleed easily.   Psychiatric/Behavioral: Negative for depression, hallucinations and suicidal ideas.               Objective:            Past Medical History:   Diagnosis Date    Acid reflux     Arthritis     Cancer 2012    surgery 4/2012 most recent check 4/2016 clear.    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     Family History   Problem Relation Age of Onset    Cancer Mother     Heart disease Father     No Known Problems Sister     No Known Problems Brother     No Known Problems Daughter     No Known Problems Son     Cancer Maternal Aunt     Heart disease Maternal Aunt     Rheum arthritis Paternal Aunt     No Known Problems Sister     No Known Problems Sister     Osteoarthritis Sister     Lupus Sister      Social History   Substance Use Topics    Smoking status: Never Smoker    Smokeless tobacco: Never Used     Alcohol use Not on file      Comment: wine once a week         Current Outpatient Prescriptions on File Prior to Visit   Medication Sig Dispense Refill    furosemide (LASIX) 20 MG tablet 1 tablet once daily.  1    gabapentin (NEURONTIN) 100 MG capsule Take 1 capsule (100 mg total) by mouth 3 (three) times daily. 90 capsule 6    hydroxychloroquine (PLAQUENIL) 200 mg tablet TAKE 1 TABLET BY MOUTH TWICE DAILY 60 tablet 6    ibuprofen (ADVIL,MOTRIN) 800 MG tablet TK 1 T PO  TID  3    LINZESS 145 mcg Cap capsule TK 1 C PO QD IN THE MORNING  5    methylPREDNISolone (MEDROL DOSEPACK) 4 mg tablet TK UTD PRN  0    omeprazole (PRILOSEC) 40 MG capsule TK 1 C PO QD  2    rosuvastatin (CRESTOR) 20 MG tablet Take 20 mg by mouth once daily.      spironolactone (ALDACTONE) 50 MG tablet TK 1 T PO D  3    VIMOVO 500-20 mg TbID TK 1 T PO  QD PRN P  3    hydrOXYzine HCl (ATARAX) 10 MG Tab TAKE 3 TABLETS BY MOUTH TWICE DAILY AS NEEDED. MAY CAUSE DROWSINESS 30 tablet 0     No current facility-administered medications on file prior to visit.        Vitals:    11/08/17 1602   BP: 120/80   Pulse: 64       Physical Exam:    Physical Exam   Constitutional: She appears well-developed and well-nourished.   HENT:   Nose: No septal deviation.   Mouth/Throat: Mucous membranes are normal. No oral lesions.   Eyes: Pupils are equal, round, and reactive to light. Right conjunctiva is not injected. Left conjunctiva is not injected.   Neck: No JVD present. No thyroid mass and no thyromegaly present.   Cardiovascular: Normal rate, regular rhythm and normal pulses.    +edema pitting LE b/l   Pulmonary/Chest: Effort normal and breath sounds normal.   Abdominal: Soft. Normal appearance. There is no hepatosplenomegaly.   Musculoskeletal:        Right shoulder: She exhibits normal range of motion, no tenderness and no swelling.        Left shoulder: She exhibits normal range of motion, no tenderness and no swelling.        Right elbow: She exhibits  normal range of motion and no swelling. No tenderness found.        Left elbow: She exhibits normal range of motion and no swelling. No tenderness found.        Right wrist: She exhibits normal range of motion, no tenderness and no swelling.        Left wrist: She exhibits normal range of motion, no tenderness and no swelling.        Right hip: She exhibits normal range of motion, normal strength and no swelling.        Left hip: She exhibits normal range of motion, no tenderness and no swelling.        Right knee: She exhibits normal range of motion and no swelling. No tenderness found.        Left knee: She exhibits normal range of motion and no swelling. No tenderness found.        Right ankle: She exhibits normal range of motion and no swelling. No tenderness.        Left ankle: She exhibits normal range of motion and no swelling. No tenderness.        Left hand: She exhibits tenderness.        Right foot: There is tenderness.        Left foot: There is tenderness.   5/5 upper and lower extremity bilateral muscle strength   Lymphadenopathy:     She has no cervical adenopathy.     She has no axillary adenopathy.   Neurological: She has normal strength and normal reflexes.   Skin: Skin is dry and intact.   Psychiatric: She has a normal mood and affect.             Assessment:       Encounter Diagnoses   Name Primary?    Undifferentiated connective tissue disease Yes    Raynaud's phenomenon without gangrene     Idiopathic peripheral neuropathy     RLS (restless legs syndrome)           Plan:        Undifferentiated connective tissue disease  -     CBC auto differential; Standing; Expected date: 11/08/2017  -     Comprehensive metabolic panel; Standing; Expected date: 11/08/2017  -     Sedimentation rate, manual; Standing; Expected date: 11/08/2017  -     C-reactive protein; Standing; Expected date: 11/08/2017    Raynaud's phenomenon without gangrene    Idiopathic peripheral neuropathy  -     gabapentin  (NEURONTIN) 100 MG capsule; Take 2 capsules (200 mg total) by mouth every evening.  Dispense: 90 capsule; Refill: 6    RLS (restless legs syndrome)    Other orders  -     pramipexole (MIRAPEX) 0.25 MG tablet; Take 1 tablet (0.25 mg total) by mouth nightly as needed.  Dispense: 90 tablet; Refill: 11        Most consistent with UCTD   -No significant SICCA to suggest Sjogrens, UCTD she has no evidence of synovitis on exam.   - I also want to r/o any RCC given recent MRI of renal mass- seen 10/2017     Some recent weight gain: wean off Gabapentin try Mirapex  Still with pruritis -excoriation on the right arms.     Return in about 4 months (around 3/8/2018).        30min consultation with greater than 50% spent in counseling, chart review and coordination of care. All questions answered.

## 2017-12-05 RX ORDER — GABAPENTIN 100 MG/1
CAPSULE ORAL
Qty: 90 CAPSULE | Refills: 11 | Status: SHIPPED | OUTPATIENT
Start: 2017-12-05 | End: 2018-04-17

## 2018-03-05 ENCOUNTER — PATIENT MESSAGE (OUTPATIENT)
Dept: RHEUMATOLOGY | Facility: CLINIC | Age: 63
End: 2018-03-05

## 2018-03-29 ENCOUNTER — LAB VISIT (OUTPATIENT)
Dept: LAB | Facility: HOSPITAL | Age: 63
End: 2018-03-29
Attending: INTERNAL MEDICINE
Payer: COMMERCIAL

## 2018-03-29 DIAGNOSIS — M35.9 UNDIFFERENTIATED CONNECTIVE TISSUE DISEASE: ICD-10-CM

## 2018-03-29 LAB
ALBUMIN SERPL BCP-MCNC: 3.8 G/DL
ALP SERPL-CCNC: 86 U/L
ALT SERPL W/O P-5'-P-CCNC: 27 U/L
ANION GAP SERPL CALC-SCNC: 8 MMOL/L
AST SERPL-CCNC: 20 U/L
BASOPHILS # BLD AUTO: 0.03 K/UL
BASOPHILS NFR BLD: 0.4 %
BILIRUB SERPL-MCNC: 0.4 MG/DL
BUN SERPL-MCNC: 19 MG/DL
CALCIUM SERPL-MCNC: 9.9 MG/DL
CHLORIDE SERPL-SCNC: 105 MMOL/L
CO2 SERPL-SCNC: 27 MMOL/L
CREAT SERPL-MCNC: 0.9 MG/DL
CRP SERPL-MCNC: 10.9 MG/L
DIFFERENTIAL METHOD: ABNORMAL
EOSINOPHIL # BLD AUTO: 0.1 K/UL
EOSINOPHIL NFR BLD: 1.6 %
ERYTHROCYTE [DISTWIDTH] IN BLOOD BY AUTOMATED COUNT: 14 %
ERYTHROCYTE [SEDIMENTATION RATE] IN BLOOD BY WESTERGREN METHOD: 38 MM/HR
EST. GFR  (AFRICAN AMERICAN): >60 ML/MIN/1.73 M^2
EST. GFR  (NON AFRICAN AMERICAN): >60 ML/MIN/1.73 M^2
GLUCOSE SERPL-MCNC: 96 MG/DL
HCT VFR BLD AUTO: 38.3 %
HGB BLD-MCNC: 12.5 G/DL
IMM GRANULOCYTES # BLD AUTO: 0.05 K/UL
IMM GRANULOCYTES NFR BLD AUTO: 0.7 %
LYMPHOCYTES # BLD AUTO: 1.1 K/UL
LYMPHOCYTES NFR BLD: 16.1 %
MCH RBC QN AUTO: 29.8 PG
MCHC RBC AUTO-ENTMCNC: 32.6 G/DL
MCV RBC AUTO: 91 FL
MONOCYTES # BLD AUTO: 0.6 K/UL
MONOCYTES NFR BLD: 8 %
NEUTROPHILS # BLD AUTO: 5.1 K/UL
NEUTROPHILS NFR BLD: 73.2 %
NRBC BLD-RTO: 0 /100 WBC
PLATELET # BLD AUTO: 296 K/UL
PMV BLD AUTO: 11.2 FL
POTASSIUM SERPL-SCNC: 4.5 MMOL/L
PROT SERPL-MCNC: 7.5 G/DL
RBC # BLD AUTO: 4.19 M/UL
SODIUM SERPL-SCNC: 140 MMOL/L
WBC # BLD AUTO: 7.01 K/UL

## 2018-03-29 PROCEDURE — 85651 RBC SED RATE NONAUTOMATED: CPT | Mod: PO

## 2018-03-29 PROCEDURE — 80053 COMPREHEN METABOLIC PANEL: CPT

## 2018-03-29 PROCEDURE — 85025 COMPLETE CBC W/AUTO DIFF WBC: CPT

## 2018-03-29 PROCEDURE — 86140 C-REACTIVE PROTEIN: CPT

## 2018-03-29 PROCEDURE — 36415 COLL VENOUS BLD VENIPUNCTURE: CPT | Mod: PO

## 2018-04-02 DIAGNOSIS — M35.00 SJOGREN'S SYNDROME, WITH UNSPECIFIED ORGAN INVOLVEMENT: ICD-10-CM

## 2018-04-04 RX ORDER — HYDROXYCHLOROQUINE SULFATE 200 MG/1
TABLET, FILM COATED ORAL
Qty: 60 TABLET | Refills: 6 | Status: SHIPPED | OUTPATIENT
Start: 2018-04-04 | End: 2018-09-13 | Stop reason: SDUPTHER

## 2018-04-19 ENCOUNTER — PATIENT MESSAGE (OUTPATIENT)
Dept: ENDOCRINOLOGY | Facility: CLINIC | Age: 63
End: 2018-04-19

## 2018-04-19 ENCOUNTER — LAB VISIT (OUTPATIENT)
Dept: LAB | Facility: HOSPITAL | Age: 63
End: 2018-04-19
Attending: INTERNAL MEDICINE
Payer: MEDICARE

## 2018-04-19 ENCOUNTER — OFFICE VISIT (OUTPATIENT)
Dept: ENDOCRINOLOGY | Facility: CLINIC | Age: 63
End: 2018-04-19
Payer: MEDICARE

## 2018-04-19 VITALS
HEIGHT: 61 IN | HEART RATE: 72 BPM | DIASTOLIC BLOOD PRESSURE: 62 MMHG | SYSTOLIC BLOOD PRESSURE: 118 MMHG | WEIGHT: 213 LBS | BODY MASS INDEX: 40.22 KG/M2

## 2018-04-19 DIAGNOSIS — E03.9 HYPOTHYROIDISM, UNSPECIFIED TYPE: ICD-10-CM

## 2018-04-19 DIAGNOSIS — E78.5 HYPERLIPIDEMIA, UNSPECIFIED HYPERLIPIDEMIA TYPE: ICD-10-CM

## 2018-04-19 DIAGNOSIS — E03.9 HYPOTHYROIDISM, UNSPECIFIED TYPE: Primary | ICD-10-CM

## 2018-04-19 LAB
T4 FREE SERPL-MCNC: 0.83 NG/DL
TSH SERPL DL<=0.005 MIU/L-ACNC: 0.13 UIU/ML

## 2018-04-19 PROCEDURE — 84443 ASSAY THYROID STIM HORMONE: CPT

## 2018-04-19 PROCEDURE — 99213 OFFICE O/P EST LOW 20 MIN: CPT | Mod: PBBFAC,PO | Performed by: INTERNAL MEDICINE

## 2018-04-19 PROCEDURE — 36415 COLL VENOUS BLD VENIPUNCTURE: CPT | Mod: PO

## 2018-04-19 PROCEDURE — 86376 MICROSOMAL ANTIBODY EACH: CPT

## 2018-04-19 PROCEDURE — 99999 PR PBB SHADOW E&M-EST. PATIENT-LVL III: CPT | Mod: PBBFAC,,, | Performed by: INTERNAL MEDICINE

## 2018-04-19 PROCEDURE — 84439 ASSAY OF FREE THYROXINE: CPT

## 2018-04-19 PROCEDURE — 99204 OFFICE O/P NEW MOD 45 MIN: CPT | Mod: S$PBB,,, | Performed by: INTERNAL MEDICINE

## 2018-04-19 RX ORDER — METFORMIN HYDROCHLORIDE 850 MG/1
850 TABLET ORAL 2 TIMES DAILY WITH MEALS
COMMUNITY

## 2018-04-19 NOTE — PROGRESS NOTES
CHIEF COMPLAINT: Hypothyroid  62 year old being seen as a new patient. Has been on synthroid (?) since end of last year. Had been on a dose as high as 100 mcg. Now on 50 mcg. Has occasional palpitations that are chronic due to MVP. Se is scheduled for an angiogram. No diarrhea or constipation but on linzess.           PAST MEDICAL HISTORY/PAST SURGICAL HISTORY:  Reviewed in EPIC    SOCIAL HISTORY: No T/A    FAMILY HISTORY:  Sister with hypothyroidism. + colon    MEDICATIONS/ALLERGIES: The patient's MedCard has been updated and reviewed.      ROS:   Constitutional: No recent significant weight change  Eyes: No recent visual changes  ENT: No dysphagia  Cardiovascular: Denies current anginal symptoms  Respiratory: has chronic SOB. Somewhat worse and scheduled for angiogram.   Gastrointestinal: Denies recent bowel disturbances  GenitoUrinary - No dysuria  Skin: No new skin rash  Neurologic: No focal neurologic complaints  Remainder ROS negative        PE:    GENERAL: Well developed, well nourished.  PSYCH:  appropriate mood and affect  EYES:  PERRL, EOM intact.  ENT: Nares patent, oropharynx clear, mucosa pink,   NECK: Supple, trachea midline, no palpable thyroid nodules.   CHEST: Resp even and unlabored, CTA bilateral.  CARDIAC: RRR, S1, S2 heard, no murmurs, rubs, S3, or S4  ABDOMEN: Soft, non-tender, non-distended;  No organomegaly  VASCULAR:  DP pulses +2/4 bilaterally, no edema  NEURO: Gait steady, CN II-VII grossly intact  SKIN: No areas of breakdown, no acanthosis nigracans.    LABS   2/27/18  TSH 0.194  FT4 1.02  FT3 2.7    THyroid US 11/17/17  4 cystic lesions on the right < 5 mm.     ASSESSMENT/PLAN:  1. Hypothyroidism- Last TSH suppressed. Unsure what dose she is currently on or if dose adjusted when had labs in Feb. She will call us when she gets home with her medications.     2. Hyperlipidemia- would need to normalize TSH before checking labs.         FOLLOWUP  TSH, Ft4, TPO today

## 2018-04-19 NOTE — LETTER
April 19, 2018      Jae Ruvalcaba MD  [address] [address]  [address]  [facility] [address]           [facility]  [address] [facility] [address] [place] [address]  Phone: [phone]  Fax: [phone]          Patient: Nesha Felix   MR Number: [id number]   YOB: 1955   Date of Visit: 4/19/2018       Dear Dr. Jae Ruvalcaba:    Thank you for referring Nesha Felix to me for evaluation. Attached you will find relevant portions of my assessment and plan of care.    If you have questions, please do not hesitate to call me. I look forward to following Nesha Felix along with you.    Sincerely,    Antonio Napier DO    Enclosure  CC:  No Recipients    If you would like to receive this communication electronically, please contact [email or web address] or [phone] to request more information on [employer] Link access.    For providers and/or their staff who would like to refer a patient to [facility], please contact us through our one-stop-shop provider referral line, [facility], at [phone].    If you feel you have received this communication in error or would no longer like to receive these types of communications, please e-mail [email or web address]

## 2018-04-20 ENCOUNTER — TELEPHONE (OUTPATIENT)
Dept: ENDOCRINOLOGY | Facility: CLINIC | Age: 63
End: 2018-04-20

## 2018-04-20 DIAGNOSIS — E03.9 HYPOTHYROIDISM, UNSPECIFIED TYPE: Primary | ICD-10-CM

## 2018-04-20 LAB — THYROPEROXIDASE IGG SERPL-ACNC: <6 IU/ML

## 2018-04-20 RX ORDER — THYROID 30 MG/1
30 TABLET ORAL
Qty: 30 TABLET | Refills: 3 | Status: SHIPPED | OUTPATIENT
Start: 2018-04-20 | End: 2018-08-13 | Stop reason: SDUPTHER

## 2018-04-20 NOTE — TELEPHONE ENCOUNTER
Let her know on too much synthroid. Will need to decrease her armour dose  If she is currently on armour 60 mg once daily then will need to decrease to armour 30 mg once daily. Make sure she starts new dose prior to her angiogram. Start today if has not taken her dose yet, otherwise start tomorrow.   Check TSH, Ft4, T3 in 4 weeks. Remind her that on lab day to take armour after labs.

## 2018-04-23 PROBLEM — R94.39 ABNORMAL STRESS TEST: Status: ACTIVE | Noted: 2018-04-23

## 2018-04-24 ENCOUNTER — OFFICE VISIT (OUTPATIENT)
Dept: RHEUMATOLOGY | Facility: CLINIC | Age: 63
End: 2018-04-24
Payer: MEDICARE

## 2018-04-24 VITALS
HEIGHT: 61 IN | HEART RATE: 68 BPM | BODY MASS INDEX: 40.47 KG/M2 | SYSTOLIC BLOOD PRESSURE: 124 MMHG | WEIGHT: 214.38 LBS | DIASTOLIC BLOOD PRESSURE: 84 MMHG

## 2018-04-24 DIAGNOSIS — M79.18 PIRIFORMIS MUSCLE PAIN: ICD-10-CM

## 2018-04-24 DIAGNOSIS — M35.9 UNDIFFERENTIATED CONNECTIVE TISSUE DISEASE: Primary | ICD-10-CM

## 2018-04-24 DIAGNOSIS — M70.62 GREATER TROCHANTERIC BURSITIS OF BOTH HIPS: ICD-10-CM

## 2018-04-24 DIAGNOSIS — M70.61 GREATER TROCHANTERIC BURSITIS OF BOTH HIPS: ICD-10-CM

## 2018-04-24 DIAGNOSIS — M21.6X9 EQUINUS DEFORMITY OF FOOT: ICD-10-CM

## 2018-04-24 DIAGNOSIS — G60.9 IDIOPATHIC PERIPHERAL NEUROPATHY: ICD-10-CM

## 2018-04-24 PROCEDURE — 99999 PR PBB SHADOW E&M-EST. PATIENT-LVL III: CPT | Mod: PBBFAC,,, | Performed by: INTERNAL MEDICINE

## 2018-04-24 PROCEDURE — 99213 OFFICE O/P EST LOW 20 MIN: CPT | Mod: PBBFAC,PO | Performed by: INTERNAL MEDICINE

## 2018-04-24 PROCEDURE — 99214 OFFICE O/P EST MOD 30 MIN: CPT | Mod: S$PBB,,, | Performed by: INTERNAL MEDICINE

## 2018-04-24 RX ORDER — PREGABALIN 25 MG/1
25 CAPSULE ORAL 2 TIMES DAILY
Qty: 60 CAPSULE | Refills: 6 | Status: SHIPPED | OUTPATIENT
Start: 2018-04-24 | End: 2018-09-13 | Stop reason: SDUPTHER

## 2018-04-24 RX ORDER — LINACLOTIDE 290 UG/1
CAPSULE, GELATIN COATED ORAL
Refills: 3 | COMMUNITY
Start: 2018-03-10 | End: 2021-04-13

## 2018-04-24 NOTE — PROGRESS NOTES
Subjective:          Chief Complaint: Nesha Felix is a 62 y.o. female who had concerns including Disease Management.    HPI:    Patient is a 62y/o female here for f/u UCTD and Raynaud's.   She Describes hx of swelling in her hands with 15-30 minutes stiffness. She notes arthralgias and myalgias that is diffuse, she notes fatigue.  She has + dry mouth and eyes mild. She notes Raynauds mild.    She did have serologies and repeat with direct ASHLEY +, SSA +, SSB negative, Smith negative and RNP negative.   CRP was elevated at 11.5 (<4.9). TPO negative/Thyroglobulin ab negative  Having itching for at least 3 years. Did not see Derm but Hydroxyzine with somnolence.   Recently did have repeat ASHLEY with IF 1:160 speckled with negative ASHLEY profile on HCQ.   NO SICCA symptoms. +malar type rash.   Gabapentin for peripheral neuropathy/RLS off no help.tried Mirapex.   She stopped HCQ for few months and noted increased joint stiffness. Better on HCQ. Restarted.   She seems to have waxing and waning aches last weekend   Vimovo for arthritis using PRN which does help  Affected joints: feet stiff hard to walk.   She has some days with entire leg that is painful. Hx of sciatic did have PT no real help. Right mostly. DDD on older MRI of the L-spine follows with Dr. Escobar at [facility].   She     She has hx of Bladder cancer with no recurrence. More recently found to have renal mass. Will be seeing MD Velazco 10/1st and 2nd/ 2017- monitoring for 1 year.    Patient deneis any hx of seizure, stroke, DVT, pericarditis, pleurisy, anemia, nephritis, leukopenia. Patient does not facial redness with slight photosensitivity (fatigue) no scarring rashes.   Dr. Bustamante-GI recent EUS with small lesion in stomach all benign    She is under significant stress,  with leukemia.   FmHx: sister with SLE (CL also my patient)    Component      Latest Ref Rng & Units 6/30/2017   Anti Sm Antibody      0.00 - 19.99 EU 0.44   Anti-Sm Interpretation       Negative Negative   Anti-SSA Antibody      0.00 - 19.99 EU 2.61   Anti-SSA Interpretation      Negative Negative   Anti-SSB Antibody      0.00 - 19.99 EU 0.41   Anti-SSB Interpretation      Negative Negative   ds DNA Ab      Negative 1:10 Negative 1:10   Anti Sm/RNP Antibody      0.00 - 19.99 EU 1.12   Anti-Sm/RNP Interpretation      Negative Negative   CRP      0.0 - 8.2 mg/L 7.5   ASHLEY HEP-2 Titer       Positive 1:160 Speckled       REVIEW OF SYSTEMS:    Review of Systems   Constitutional: Positive for malaise/fatigue. Negative for fever and weight loss.   HENT: Negative for sore throat.    Eyes: Negative for double vision, photophobia and redness.   Respiratory: Negative for cough, shortness of breath and wheezing.    Cardiovascular: Positive for leg swelling. Negative for chest pain, palpitations and orthopnea.   Gastrointestinal: Negative for abdominal pain, constipation and diarrhea.   Genitourinary: Negative for dysuria, hematuria and urgency.   Musculoskeletal: Positive for joint pain and myalgias. Negative for back pain.   Skin: Negative for rash.   Neurological: Negative for dizziness, tingling, focal weakness and headaches.   Endo/Heme/Allergies: Does not bruise/bleed easily.   Psychiatric/Behavioral: Negative for depression, hallucinations and suicidal ideas.               Objective:            Past Medical History:   Diagnosis Date    Acid reflux     Cancer 2012     bladder; surgery 4/2012 most recent check 4/2016 clear.    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    Rheumatoid arthritis     rheumatoid      Family History   Problem Relation Age of Onset    Cancer Mother     Heart disease Father     No Known Problems Sister     No Known Problems Brother     No Known Problems Daughter     No Known Problems Son     Cancer Maternal Aunt     Heart disease Maternal Aunt     Rheum arthritis Paternal Aunt     No Known Problems Sister     No Known  Problems Sister     Osteoarthritis Sister     Lupus Sister      Social History   Substance Use Topics    Smoking status: Never Smoker    Smokeless tobacco: Never Used    Alcohol use Not on file      Comment: wine once a week         Current Outpatient Prescriptions on File Prior to Visit   Medication Sig Dispense Refill    hydroxychloroquine (PLAQUENIL) 200 mg tablet TAKE 1 TABLET BY MOUTH TWICE DAILY 60 tablet 6    ibuprofen (ADVIL,MOTRIN) 800 MG tablet TK 1 T PO  TID  3    metFORMIN (GLUCOPHAGE) 850 MG tablet Take 850 mg by mouth 2 (two) times daily with meals.      omeprazole (PRILOSEC) 40 MG capsule TK 1 C PO QD  2    rosuvastatin (CRESTOR) 20 MG tablet Take 20 mg by mouth every evening.       spironolactone (ALDACTONE) 50 MG tablet TK 1 T PO D  3    thyroid (ARMOUR THYROID) Tab Take 1 tablet (30 mg total) by mouth before breakfast. 30 tablet 3    hydrOXYzine HCl (ATARAX) 10 MG Tab TAKE 3 TABLETS BY MOUTH TWICE DAILY AS NEEDED. MAY CAUSE DROWSINESS 30 tablet 0    VIMOVO 500-20 mg TbID TK 1 T PO  QD PRN P  3    [DISCONTINUED] LINZESS 145 mcg Cap capsule TK 1 C PO QD IN THE MORNING;  5     Current Facility-Administered Medications on File Prior to Visit   Medication Dose Route Frequency Provider Last Rate Last Dose    [DISCONTINUED] 0.9%  NaCl infusion   Intravenous Continuous Dion Dickson MD   Stopped at 04/23/18 0645    [DISCONTINUED] 0.9%  NaCl infusion  125 mL/hr Intravenous Continuous Dion Dickson MD   Stopped at 04/23/18 1100    [DISCONTINUED] acetaminophen tablet 650 mg  650 mg Oral Q4H PRN Dion Dickson MD        [DISCONTINUED] electrolyte-R (pH7.4) IV SOLP 1,000 mL with heparin (porcine) 2,000 Units irrigation    PRN Dion Dickson MD        [DISCONTINUED] fentaNYL injection    PRN Dion Dickson MD   75 mcg at 04/23/18 0743    [DISCONTINUED] heparin (porcine) injection    PRN Dion Dickson MD   2,000 Units at 04/23/18 0744    [DISCONTINUED] heparin infusion 1,000  units/500 ml in 0.9% NaCl (on sterile field)    PRN Dion Dickson MD   1 mL at 04/23/18 0745    [DISCONTINUED] hydrALAZINE injection 10 mg  10 mg Intravenous Q6H PRN Dion Dickson MD        [DISCONTINUED] hydrocodone-acetaminophen 5-325mg per tablet 1 tablet  1 tablet Oral Q4H PRN Dion Dickson MD        [DISCONTINUED] HYDROmorphone injection 0.5 mg  0.5 mg Intravenous Q3H PRN Dion Dickson MD        [DISCONTINUED] lidocaine (PF) 10 mg/ml (1%) injection    PRN Dion Dickson MD   10 mL at 04/23/18 0746    [DISCONTINUED] midazolam injection    PRN Dion Dickson MD   5 mg at 04/23/18 0747    [DISCONTINUED] nitroGLYCERIN 50 mg in dextrose 5 % 250 mL infusion (NON-TITRATING)    Continuous PRN Dion Dickson MD   400 mcg at 04/23/18 0746    [DISCONTINUED] promethazine (PHENERGAN) 6.25 mg in dextrose 5 % 50 mL IVPB  6.25 mg Intravenous Q6H PRN Dion Dickson MD        [DISCONTINUED] verapamil injection    PRN Dion Dickson MD   2.5 mg at 04/23/18 0747       Vitals:    04/24/18 1203   BP: 124/84   Pulse: 68       Physical Exam:    Physical Exam   Constitutional: She appears well-developed and well-nourished.   HENT:   Nose: No septal deviation.   Mouth/Throat: Mucous membranes are normal. No oral lesions.   Eyes: Pupils are equal, round, and reactive to light. Right conjunctiva is not injected. Left conjunctiva is not injected.   Neck: No JVD present. No thyroid mass and no thyromegaly present.   Cardiovascular: Normal rate, regular rhythm and normal pulses.    +edema pitting LE b/l   Pulmonary/Chest: Effort normal and breath sounds normal.   Abdominal: Soft. Normal appearance. There is no hepatosplenomegaly.   Musculoskeletal:        Right shoulder: She exhibits normal range of motion, no tenderness and no swelling.        Left shoulder: She exhibits normal range of motion, no tenderness and no swelling.        Right elbow: She exhibits normal range of motion and no swelling. No tenderness  found.        Left elbow: She exhibits normal range of motion and no swelling. No tenderness found.        Right wrist: She exhibits normal range of motion, no tenderness and no swelling.        Left wrist: She exhibits normal range of motion, no tenderness and no swelling.        Right hip: She exhibits normal range of motion, normal strength and no swelling.        Left hip: She exhibits normal range of motion, no tenderness and no swelling.        Right knee: She exhibits normal range of motion and no swelling. No tenderness found.        Left knee: She exhibits normal range of motion and no swelling. No tenderness found.        Right ankle: She exhibits normal range of motion and no swelling. No tenderness.        Left ankle: She exhibits normal range of motion and no swelling. No tenderness.        Left hand: She exhibits tenderness.        Right foot: There is tenderness.        Left foot: There is tenderness.   5/5 upper and lower extremity bilateral muscle strength   Lymphadenopathy:     She has no cervical adenopathy.     She has no axillary adenopathy.   Neurological: She has normal strength and normal reflexes.   Skin: Skin is dry and intact.   Psychiatric: She has a normal mood and affect.             Assessment:       Encounter Diagnoses   Name Primary?    Undifferentiated connective tissue disease Yes    Greater trochanteric bursitis of both hips     Piriformis muscle pain     Equinus deformity of foot     Idiopathic peripheral neuropathy           Plan:        Undifferentiated connective tissue disease    Greater trochanteric bursitis of both hips    Piriformis muscle pain    Equinus deformity of foot    Idiopathic peripheral neuropathy    Other orders  -     pregabalin (LYRICA) 25 MG capsule; Take 1 capsule (25 mg total) by mouth 2 (two) times daily.  Dispense: 60 capsule; Refill: 6        Most consistent with UCTD   -No significant SICCA to suggest Sjogrens, UCTD she has no evidence of  synovitis on exam.       Some recent weight gain: weaned off gabapentin, mirapex did not help. Will try Lyrica     Still with pruritis -excoriation on the right arms.   Renal mass followed in [place] and stable.       Follow-up in about 4 months (around 8/24/2018).        30min consultation with greater than 50% spent in counseling, chart review and coordination of care. All questions answered.

## 2018-04-25 ENCOUNTER — PATIENT MESSAGE (OUTPATIENT)
Dept: RHEUMATOLOGY | Facility: CLINIC | Age: 63
End: 2018-04-25

## 2018-04-25 DIAGNOSIS — Z86.69 HX OF MIGRAINES: Primary | ICD-10-CM

## 2018-04-27 RX ORDER — TOPIRAMATE 50 MG/1
50 TABLET, FILM COATED ORAL 2 TIMES DAILY
Qty: 60 TABLET | Refills: 11 | Status: SHIPPED | OUTPATIENT
Start: 2018-04-27 | End: 2018-09-13

## 2018-04-28 ENCOUNTER — PATIENT MESSAGE (OUTPATIENT)
Dept: RHEUMATOLOGY | Facility: CLINIC | Age: 63
End: 2018-04-28

## 2018-05-21 ENCOUNTER — LAB VISIT (OUTPATIENT)
Dept: LAB | Facility: HOSPITAL | Age: 63
End: 2018-05-21
Attending: INTERNAL MEDICINE
Payer: MEDICARE

## 2018-05-21 DIAGNOSIS — E03.9 HYPOTHYROIDISM, UNSPECIFIED TYPE: ICD-10-CM

## 2018-05-21 LAB
T3 SERPL-MCNC: 69 NG/DL
T4 FREE SERPL-MCNC: 0.82 NG/DL
TSH SERPL DL<=0.005 MIU/L-ACNC: 1.06 UIU/ML

## 2018-05-21 PROCEDURE — 84480 ASSAY TRIIODOTHYRONINE (T3): CPT

## 2018-05-21 PROCEDURE — 84439 ASSAY OF FREE THYROXINE: CPT

## 2018-05-21 PROCEDURE — 84443 ASSAY THYROID STIM HORMONE: CPT

## 2018-05-21 PROCEDURE — 36415 COLL VENOUS BLD VENIPUNCTURE: CPT | Mod: PO

## 2018-05-25 ENCOUNTER — PATIENT MESSAGE (OUTPATIENT)
Dept: RHEUMATOLOGY | Facility: CLINIC | Age: 63
End: 2018-05-25

## 2018-08-13 RX ORDER — THYROID, PORCINE 30 MG/1
TABLET ORAL
Qty: 30 TABLET | Refills: 3 | Status: SHIPPED | OUTPATIENT
Start: 2018-08-13 | End: 2018-12-10

## 2018-09-11 ENCOUNTER — PATIENT MESSAGE (OUTPATIENT)
Dept: RHEUMATOLOGY | Facility: CLINIC | Age: 63
End: 2018-09-11

## 2018-09-12 ENCOUNTER — LAB VISIT (OUTPATIENT)
Dept: LAB | Facility: HOSPITAL | Age: 63
End: 2018-09-12
Attending: INTERNAL MEDICINE
Payer: MEDICARE

## 2018-09-12 ENCOUNTER — PATIENT MESSAGE (OUTPATIENT)
Dept: RHEUMATOLOGY | Facility: CLINIC | Age: 63
End: 2018-09-12

## 2018-09-12 DIAGNOSIS — M35.9 UNDIFFERENTIATED CONNECTIVE TISSUE DISEASE: ICD-10-CM

## 2018-09-12 LAB
ALBUMIN SERPL BCP-MCNC: 4.1 G/DL
ALP SERPL-CCNC: 84 U/L
ALT SERPL W/O P-5'-P-CCNC: 31 U/L
ANION GAP SERPL CALC-SCNC: 9 MMOL/L
AST SERPL-CCNC: 29 U/L
BASOPHILS # BLD AUTO: 0.03 K/UL
BASOPHILS NFR BLD: 0.4 %
BILIRUB SERPL-MCNC: 0.3 MG/DL
BUN SERPL-MCNC: 26 MG/DL
CALCIUM SERPL-MCNC: 10.1 MG/DL
CHLORIDE SERPL-SCNC: 104 MMOL/L
CO2 SERPL-SCNC: 25 MMOL/L
CREAT SERPL-MCNC: 0.9 MG/DL
CRP SERPL-MCNC: 8.4 MG/L
DIFFERENTIAL METHOD: NORMAL
EOSINOPHIL # BLD AUTO: 0.1 K/UL
EOSINOPHIL NFR BLD: 0.9 %
ERYTHROCYTE [DISTWIDTH] IN BLOOD BY AUTOMATED COUNT: 13.6 %
ERYTHROCYTE [SEDIMENTATION RATE] IN BLOOD BY WESTERGREN METHOD: 25 MM/HR
EST. GFR  (AFRICAN AMERICAN): >60 ML/MIN/1.73 M^2
EST. GFR  (NON AFRICAN AMERICAN): >60 ML/MIN/1.73 M^2
GLUCOSE SERPL-MCNC: 96 MG/DL
HCT VFR BLD AUTO: 39.4 %
HGB BLD-MCNC: 13 G/DL
IMM GRANULOCYTES # BLD AUTO: 0.03 K/UL
IMM GRANULOCYTES NFR BLD AUTO: 0.4 %
LYMPHOCYTES # BLD AUTO: 1.4 K/UL
LYMPHOCYTES NFR BLD: 18.8 %
MCH RBC QN AUTO: 30.2 PG
MCHC RBC AUTO-ENTMCNC: 33 G/DL
MCV RBC AUTO: 92 FL
MONOCYTES # BLD AUTO: 0.6 K/UL
MONOCYTES NFR BLD: 7.9 %
NEUTROPHILS # BLD AUTO: 5.3 K/UL
NEUTROPHILS NFR BLD: 71.6 %
NRBC BLD-RTO: 0 /100 WBC
PLATELET # BLD AUTO: 274 K/UL
PMV BLD AUTO: 11.7 FL
POTASSIUM SERPL-SCNC: 4.2 MMOL/L
PROT SERPL-MCNC: 7.7 G/DL
RBC # BLD AUTO: 4.3 M/UL
SODIUM SERPL-SCNC: 138 MMOL/L
WBC # BLD AUTO: 7.46 K/UL

## 2018-09-12 PROCEDURE — 85025 COMPLETE CBC W/AUTO DIFF WBC: CPT

## 2018-09-12 PROCEDURE — 85651 RBC SED RATE NONAUTOMATED: CPT | Mod: PO

## 2018-09-12 PROCEDURE — 86140 C-REACTIVE PROTEIN: CPT

## 2018-09-12 PROCEDURE — 36415 COLL VENOUS BLD VENIPUNCTURE: CPT | Mod: PO

## 2018-09-12 PROCEDURE — 80053 COMPREHEN METABOLIC PANEL: CPT

## 2018-09-13 ENCOUNTER — OFFICE VISIT (OUTPATIENT)
Dept: RHEUMATOLOGY | Facility: CLINIC | Age: 63
End: 2018-09-13
Payer: MEDICARE

## 2018-09-13 VITALS
DIASTOLIC BLOOD PRESSURE: 74 MMHG | BODY MASS INDEX: 40.22 KG/M2 | HEIGHT: 61 IN | HEART RATE: 61 BPM | WEIGHT: 213.06 LBS | SYSTOLIC BLOOD PRESSURE: 138 MMHG

## 2018-09-13 DIAGNOSIS — M35.9 UNDIFFERENTIATED CONNECTIVE TISSUE DISEASE: Primary | ICD-10-CM

## 2018-09-13 DIAGNOSIS — M79.7 FIBROMYALGIA: ICD-10-CM

## 2018-09-13 PROCEDURE — 99213 OFFICE O/P EST LOW 20 MIN: CPT | Mod: PBBFAC,PO | Performed by: INTERNAL MEDICINE

## 2018-09-13 PROCEDURE — 99214 OFFICE O/P EST MOD 30 MIN: CPT | Mod: S$PBB,,, | Performed by: INTERNAL MEDICINE

## 2018-09-13 PROCEDURE — 99999 PR PBB SHADOW E&M-EST. PATIENT-LVL III: CPT | Mod: PBBFAC,,, | Performed by: INTERNAL MEDICINE

## 2018-09-13 RX ORDER — HYDROXYCHLOROQUINE SULFATE 200 MG/1
200 TABLET, FILM COATED ORAL 2 TIMES DAILY
Qty: 60 TABLET | Refills: 6 | Status: SHIPPED | OUTPATIENT
Start: 2018-09-13 | End: 2019-09-07 | Stop reason: SDUPTHER

## 2018-09-13 RX ORDER — PREGABALIN 25 MG/1
25 CAPSULE ORAL 2 TIMES DAILY
Qty: 60 CAPSULE | Refills: 6 | Status: SHIPPED | OUTPATIENT
Start: 2018-09-13 | End: 2019-03-13

## 2018-09-13 NOTE — PROGRESS NOTES
Subjective:          Chief Complaint: Nesha Felix is a 63 y.o. female who had concerns including Undifferentiated connective tissue disease.    HPI:    Patient is a 60y/o female here for f/u UCTD and Raynaud's.   She Describes hx of swelling in her hands with 15-30 minutes stiffness. She notes arthralgias and myalgias that is diffuse, she notes fatigue.   She notes Raynauds mild.    She did have serologies and repeat with direct ASHLEY +, SSA +, SSB negative, Smith negative and RNP negative.   CRP was elevated at 11.5 (<4.9). TPO negative/Thyroglobulin ab negative  Having itching for at least 3 years. Did not see Derm but Hydroxyzine with somnolence.   Recently did have repeat ASHLEY with IF 1:160 speckled with negative ASHLEY profile on HCQ.   NO SICCA symptoms. +malar type rash.   Tried Lyrica 25mg BID which seems to be helping with myalgias  Joints WW and trying to exercise.   She stopped HCQ for few months and noted increased joint stiffness. Better on HCQ. Restarted.   She seems to have waxing and waning aches last weekend   Vimovo for arthritis using PRN which does help  Affected joints: feet stiff hard to walk.   She has some days with entire leg that is painful. Hx of sciatic did have PT no real help. Right mostly. DDD on older MRI of the L-spine follows with Dr. Escobar at [facility].   She     She has hx of Bladder cancer with no recurrence. More recently found to have renal mass. Will be seeing MD Velazco 10/1st and 2nd/ 2017- monitoring for 1 year.    Patient deneis any hx of seizure, stroke, DVT, pericarditis, pleurisy, anemia, nephritis, leukopenia. Patient does not facial redness with slight photosensitivity (fatigue) no scarring rashes.   Dr. Bustamante-GI recent EUS with small lesion in stomach all benign    She is under significant stress,  with leukemia.   FmHx: sister with SLE (CL also my patient)    Component      Latest Ref Rng & Units 6/30/2017   Anti Sm Antibody      0.00 - 19.99 EU 0.44   Anti-Sm  Interpretation      Negative Negative   Anti-SSA Antibody      0.00 - 19.99 EU 2.61   Anti-SSA Interpretation      Negative Negative   Anti-SSB Antibody      0.00 - 19.99 EU 0.41   Anti-SSB Interpretation      Negative Negative   ds DNA Ab      Negative 1:10 Negative 1:10   Anti Sm/RNP Antibody      0.00 - 19.99 EU 1.12   Anti-Sm/RNP Interpretation      Negative Negative   CRP      0.0 - 8.2 mg/L 7.5   ASHLEY HEP-2 Titer       Positive 1:160 Speckled       REVIEW OF SYSTEMS:    Review of Systems   Constitutional: Positive for malaise/fatigue. Negative for fever and weight loss.   HENT: Negative for sore throat.    Eyes: Negative for double vision, photophobia and redness.   Respiratory: Negative for cough, shortness of breath and wheezing.    Cardiovascular: Positive for leg swelling. Negative for chest pain, palpitations and orthopnea.   Gastrointestinal: Negative for abdominal pain, constipation and diarrhea.   Genitourinary: Negative for dysuria, hematuria and urgency.   Musculoskeletal: Positive for joint pain and myalgias. Negative for back pain.   Skin: Negative for rash.   Neurological: Negative for dizziness, tingling, focal weakness and headaches.   Endo/Heme/Allergies: Does not bruise/bleed easily.   Psychiatric/Behavioral: Negative for depression, hallucinations and suicidal ideas.               Objective:            Past Medical History:   Diagnosis Date    Acid reflux     Cancer 2012     bladder; surgery 4/2012 most recent check 4/2016 clear.    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    Rheumatoid arthritis     rheumatoid      Family History   Problem Relation Age of Onset    Cancer Mother     Heart disease Father     No Known Problems Sister     No Known Problems Brother     No Known Problems Daughter     No Known Problems Son     Cancer Maternal Aunt     Heart disease Maternal Aunt     Rheum arthritis Paternal Aunt     No Known Problems  Sister     No Known Problems Sister     Osteoarthritis Sister     Lupus Sister      Social History     Tobacco Use    Smoking status: Never Smoker    Smokeless tobacco: Never Used   Substance Use Topics    Alcohol use: Not on file     Comment: wine once a week    Drug use: No         Current Outpatient Medications on File Prior to Visit   Medication Sig Dispense Refill    ARMOUR THYROID Tab TAKE 1 TABLET BY MOUTH EVERY MORNING BEFORE A MEAL 30 tablet 3    BIOTIN ORAL Take 5,000 mg by mouth.      hydroxychloroquine (PLAQUENIL) 200 mg tablet TAKE 1 TABLET BY MOUTH TWICE DAILY 60 tablet 6    hydrOXYzine HCl (ATARAX) 10 MG Tab TAKE 3 TABLETS BY MOUTH TWICE DAILY AS NEEDED. MAY CAUSE DROWSINESS 30 tablet 0    ibuprofen (ADVIL,MOTRIN) 800 MG tablet TK 1 T PO  TID  3    LINZESS 290 mcg Cap TK 1 C PO QD  3    metFORMIN (GLUCOPHAGE) 850 MG tablet Take 850 mg by mouth 2 (two) times daily with meals.      omeprazole (PRILOSEC) 40 MG capsule TK 1 C PO QD  2    pregabalin (LYRICA) 25 MG capsule Take 1 capsule (25 mg total) by mouth 2 (two) times daily. 60 capsule 6    rosuvastatin (CRESTOR) 20 MG tablet Take 20 mg by mouth every evening.       spironolactone (ALDACTONE) 50 MG tablet TK 1 T PO D  3    VIMOVO 500-20 mg TbID TK 1 T PO  QD PRN P  3    [DISCONTINUED] topiramate (TOPAMAX) 50 MG tablet Take 1 tablet (50 mg total) by mouth 2 (two) times daily. 60 tablet 11     No current facility-administered medications on file prior to visit.        Vitals:    09/13/18 1621   BP: 138/74   Pulse: 61       Physical Exam:    Physical Exam   Constitutional: She appears well-developed and well-nourished.   HENT:   Nose: No septal deviation.   Mouth/Throat: Mucous membranes are normal. No oral lesions.   Eyes: Pupils are equal, round, and reactive to light. Right conjunctiva is not injected. Left conjunctiva is not injected.   Neck: No JVD present. No thyroid mass and no thyromegaly present.   Cardiovascular: Normal  rate, regular rhythm and normal pulses.   +edema pitting LE b/l   Pulmonary/Chest: Effort normal and breath sounds normal.   Abdominal: Soft. Normal appearance. There is no hepatosplenomegaly.   Musculoskeletal:        Right shoulder: She exhibits normal range of motion, no tenderness and no swelling.        Left shoulder: She exhibits normal range of motion, no tenderness and no swelling.        Right elbow: She exhibits normal range of motion and no swelling. No tenderness found.        Left elbow: She exhibits normal range of motion and no swelling. No tenderness found.        Right wrist: She exhibits normal range of motion, no tenderness and no swelling.        Left wrist: She exhibits normal range of motion, no tenderness and no swelling.        Right hip: She exhibits normal range of motion, normal strength and no swelling.        Left hip: She exhibits normal range of motion, no tenderness and no swelling.        Right knee: She exhibits normal range of motion and no swelling. No tenderness found.        Left knee: She exhibits normal range of motion and no swelling. No tenderness found.        Right ankle: She exhibits normal range of motion and no swelling. No tenderness.        Left ankle: She exhibits normal range of motion and no swelling. No tenderness.        Left hand: She exhibits tenderness.        Right foot: There is tenderness.        Left foot: There is tenderness.   5/5 upper and lower extremity bilateral muscle strength   Lymphadenopathy:     She has no cervical adenopathy.     She has no axillary adenopathy.   Neurological: She has normal strength and normal reflexes.   Skin: Skin is dry and intact.   Psychiatric: She has a normal mood and affect.             Assessment:       No diagnosis found.       Plan:        There are no diagnoses linked to this encounter.    Most consistent with UCTD   -No significant SICCA to suggest Sjogrens, UCTD she has no evidence of synovitis on  exam.       Some recent weight gain: weaned off gabapentin, mirapex did not help. Will try Lyrica     Still with pruritis -excoriation on the right arms.   Renal mass followed in [place] and stable.       No Follow-up on file.        30min consultation with greater than 50% spent in counseling, chart review and coordination of care. All questions answered.

## 2018-09-19 ENCOUNTER — TELEPHONE (OUTPATIENT)
Dept: RHEUMATOLOGY | Facility: CLINIC | Age: 63
End: 2018-09-19

## 2018-09-19 NOTE — TELEPHONE ENCOUNTER
----- Message from Nohemy Degroot sent at 9/19/2018  8:39 AM CDT -----  Contact: Bella from [employer]  Name of Who is Calling: Bella      What is the request in detail: Bella is calling requesting to speak with a nurse in regards to getting a verbal for patient to be able to fly.      Can the clinic reply by [facility]: no      [facility] Bella [phone]

## 2018-09-20 ENCOUNTER — TELEPHONE (OUTPATIENT)
Dept: RHEUMATOLOGY | Facility: CLINIC | Age: 63
End: 2018-09-20

## 2018-09-20 NOTE — TELEPHONE ENCOUNTER
Returned [facility] [employer] call. Gave verbal patient does not require oxygen nor iv medications.

## 2018-09-20 NOTE — TELEPHONE ENCOUNTER
----- Message from Hadley Sears sent at 9/20/2018  8:41 AM CDT -----  Type: Needs Medical Advice    Who Called:  Kendal alexis - LarrySymptoms (please be specific):    How long has patient had these symptoms:  [facility] name and phone #:  [facility]  Best Call Back Number: [phone]  Additional Information: The office is faxing a form regarding the patient, asking if the patient is ambulatory.

## 2018-10-19 ENCOUNTER — TELEPHONE (OUTPATIENT)
Dept: ENDOCRINOLOGY | Facility: CLINIC | Age: 63
End: 2018-10-19

## 2018-10-19 NOTE — TELEPHONE ENCOUNTER
"Pt states she will call back Nov 1st for an appt in May 2019. Has been having skin itching, her and PCP believe it is a reaction to [place]. Pt wants to have PCP follow and will contact Dr. Napier if needed while waiting for an appt with Dr. Napier  "thank you"  "

## 2018-10-19 NOTE — TELEPHONE ENCOUNTER
----- Message from Jolly Law sent at 10/19/2018 12:06 PM CDT -----  Type:  Sooner Apoointment Request    Caller is requesting a sooner appointment.  Caller declined first available appointment listed below.  Caller will not accept being placed on the waitlist and is requesting a message be sent to doctor.    Name of Caller: Patient  When is the first available appointment?  Na  Symptoms:  Discuss medications, thyroids  Best Call Back Number:  [phone] (home)     Additional Information:  Wanted to be seen soonest available

## 2018-11-11 DIAGNOSIS — M35.00 SJOGREN'S SYNDROME, WITH UNSPECIFIED ORGAN INVOLVEMENT: ICD-10-CM

## 2018-11-13 RX ORDER — HYDROXYCHLOROQUINE SULFATE 200 MG/1
TABLET, FILM COATED ORAL
Qty: 60 TABLET | Refills: 6 | Status: SHIPPED | OUTPATIENT
Start: 2018-11-13 | End: 2019-03-13

## 2018-12-10 RX ORDER — THYROID, PORCINE 30 MG/1
TABLET ORAL
Qty: 30 TABLET | Refills: 0 | Status: SHIPPED | OUTPATIENT
Start: 2018-12-10 | End: 2021-04-13

## 2019-03-07 ENCOUNTER — PATIENT MESSAGE (OUTPATIENT)
Dept: RHEUMATOLOGY | Facility: CLINIC | Age: 64
End: 2019-03-07

## 2019-03-07 DIAGNOSIS — M19.90 INFLAMMATORY ARTHRITIS: Primary | ICD-10-CM

## 2019-03-08 ENCOUNTER — PATIENT MESSAGE (OUTPATIENT)
Dept: RHEUMATOLOGY | Facility: CLINIC | Age: 64
End: 2019-03-08

## 2019-03-11 ENCOUNTER — LAB VISIT (OUTPATIENT)
Dept: LAB | Facility: HOSPITAL | Age: 64
End: 2019-03-11
Attending: INTERNAL MEDICINE
Payer: MEDICARE

## 2019-03-11 DIAGNOSIS — M19.90 INFLAMMATORY ARTHRITIS: ICD-10-CM

## 2019-03-11 LAB
ALBUMIN SERPL BCP-MCNC: 4.3 G/DL
ALP SERPL-CCNC: 87 U/L
ALT SERPL W/O P-5'-P-CCNC: 26 U/L
ANION GAP SERPL CALC-SCNC: 6 MMOL/L
AST SERPL-CCNC: 23 U/L
BASOPHILS # BLD AUTO: 0.04 K/UL
BASOPHILS NFR BLD: 0.6 %
BILIRUB SERPL-MCNC: 0.3 MG/DL
BUN SERPL-MCNC: 29 MG/DL
CALCIUM SERPL-MCNC: 10.1 MG/DL
CHLORIDE SERPL-SCNC: 100 MMOL/L
CO2 SERPL-SCNC: 27 MMOL/L
CREAT SERPL-MCNC: 1.1 MG/DL
CRP SERPL-MCNC: 4 MG/L
DIFFERENTIAL METHOD: ABNORMAL
EOSINOPHIL # BLD AUTO: 0.1 K/UL
EOSINOPHIL NFR BLD: 0.9 %
ERYTHROCYTE [DISTWIDTH] IN BLOOD BY AUTOMATED COUNT: 13 %
ERYTHROCYTE [SEDIMENTATION RATE] IN BLOOD BY WESTERGREN METHOD: 39 MM/HR
EST. GFR  (AFRICAN AMERICAN): >60 ML/MIN/1.73 M^2
EST. GFR  (NON AFRICAN AMERICAN): 52.8 ML/MIN/1.73 M^2
GLUCOSE SERPL-MCNC: 91 MG/DL
HCT VFR BLD AUTO: 41 %
HGB BLD-MCNC: 13.5 G/DL
IMM GRANULOCYTES # BLD AUTO: 0.04 K/UL
IMM GRANULOCYTES NFR BLD AUTO: 0.6 %
LYMPHOCYTES # BLD AUTO: 1.4 K/UL
LYMPHOCYTES NFR BLD: 20.2 %
MCH RBC QN AUTO: 30.1 PG
MCHC RBC AUTO-ENTMCNC: 32.9 G/DL
MCV RBC AUTO: 91 FL
MONOCYTES # BLD AUTO: 0.5 K/UL
MONOCYTES NFR BLD: 7 %
NEUTROPHILS # BLD AUTO: 5 K/UL
NEUTROPHILS NFR BLD: 70.7 %
NRBC BLD-RTO: 0 /100 WBC
PLATELET # BLD AUTO: 308 K/UL
PMV BLD AUTO: 11 FL
POTASSIUM SERPL-SCNC: 4.4 MMOL/L
PROT SERPL-MCNC: 8 G/DL
RBC # BLD AUTO: 4.49 M/UL
SODIUM SERPL-SCNC: 133 MMOL/L
WBC # BLD AUTO: 6.99 K/UL

## 2019-03-11 PROCEDURE — 86140 C-REACTIVE PROTEIN: CPT

## 2019-03-11 PROCEDURE — 85651 RBC SED RATE NONAUTOMATED: CPT | Mod: PO

## 2019-03-11 PROCEDURE — 36415 COLL VENOUS BLD VENIPUNCTURE: CPT | Mod: PO

## 2019-03-11 PROCEDURE — 80053 COMPREHEN METABOLIC PANEL: CPT

## 2019-03-11 PROCEDURE — 85025 COMPLETE CBC W/AUTO DIFF WBC: CPT

## 2019-03-13 ENCOUNTER — OFFICE VISIT (OUTPATIENT)
Dept: RHEUMATOLOGY | Facility: CLINIC | Age: 64
End: 2019-03-13
Payer: MEDICARE

## 2019-03-13 VITALS
DIASTOLIC BLOOD PRESSURE: 68 MMHG | HEART RATE: 76 BPM | BODY MASS INDEX: 37.76 KG/M2 | WEIGHT: 200 LBS | HEIGHT: 61 IN | SYSTOLIC BLOOD PRESSURE: 125 MMHG

## 2019-03-13 DIAGNOSIS — M35.9 UNDIFFERENTIATED CONNECTIVE TISSUE DISEASE: Primary | ICD-10-CM

## 2019-03-13 PROBLEM — M70.62 GREATER TROCHANTERIC BURSITIS OF BOTH HIPS: Status: RESOLVED | Noted: 2018-04-24 | Resolved: 2019-03-13

## 2019-03-13 PROBLEM — M79.18 PIRIFORMIS MUSCLE PAIN: Status: RESOLVED | Noted: 2018-04-24 | Resolved: 2019-03-13

## 2019-03-13 PROBLEM — M70.61 GREATER TROCHANTERIC BURSITIS OF BOTH HIPS: Status: RESOLVED | Noted: 2018-04-24 | Resolved: 2019-03-13

## 2019-03-13 PROCEDURE — 99213 OFFICE O/P EST LOW 20 MIN: CPT | Mod: PBBFAC,PO | Performed by: INTERNAL MEDICINE

## 2019-03-13 PROCEDURE — 99999 PR PBB SHADOW E&M-EST. PATIENT-LVL III: CPT | Mod: PBBFAC,,, | Performed by: INTERNAL MEDICINE

## 2019-03-13 PROCEDURE — 99999 PR PBB SHADOW E&M-EST. PATIENT-LVL III: ICD-10-PCS | Mod: PBBFAC,,, | Performed by: INTERNAL MEDICINE

## 2019-03-13 PROCEDURE — 99214 PR OFFICE/OUTPT VISIT, EST, LEVL IV, 30-39 MIN: ICD-10-PCS | Mod: S$PBB,,, | Performed by: INTERNAL MEDICINE

## 2019-03-13 PROCEDURE — 99214 OFFICE O/P EST MOD 30 MIN: CPT | Mod: S$PBB,,, | Performed by: INTERNAL MEDICINE

## 2019-03-13 RX ORDER — CLOPIDOGREL BISULFATE 75 MG/1
TABLET ORAL
COMMUNITY
Start: 2019-03-07 | End: 2019-08-14

## 2019-03-13 RX ORDER — PHENTERMINE HYDROCHLORIDE 37.5 MG/1
TABLET ORAL
Refills: 0 | COMMUNITY
Start: 2018-12-08 | End: 2021-04-13

## 2019-03-13 RX ORDER — METOPROLOL SUCCINATE 25 MG/1
TABLET, EXTENDED RELEASE ORAL
COMMUNITY
Start: 2019-03-04 | End: 2019-04-02

## 2019-03-13 RX ORDER — METOPROLOL SUCCINATE 25 MG/1
TABLET, EXTENDED RELEASE ORAL
COMMUNITY
Start: 2019-03-01

## 2019-03-13 RX ORDER — PREGABALIN 25 MG/1
25 CAPSULE ORAL 2 TIMES DAILY
COMMUNITY
End: 2019-04-02

## 2019-03-13 ASSESSMENT — ROUTINE ASSESSMENT OF PATIENT INDEX DATA (RAPID3)
PATIENT GLOBAL ASSESSMENT SCORE: 0
PSYCHOLOGICAL DISTRESS SCORE: 1.1
MDHAQ FUNCTION SCORE: 0
PAIN SCORE: 0
TOTAL RAPID3 SCORE: 0

## 2019-03-13 NOTE — PROGRESS NOTES
Subjective:          Chief Complaint: Nesha Felix is a 63 y.o. female who had concerns including Disease Management (undifferentiated connective tissue disease).    HPI:    Patient is a 60y/o female here for f/u UCTD and Raynaud's.   She Describes hx of swelling in her hands with 15-30 minutes stiffness. She notes arthralgias and myalgias that is diffuse, she notes fatigue.   She notes Raynauds mild.    She did have serologies and repeat with direct ASHLEY +, SSA +, SSB negative, Smith negative and RNP negative.   \TPO negative/Thyroglobulin ab negative  Having itching for at least 3 years. Did not see Derm but Hydroxyzine with somnolence.   Recently did have repeat ASHLEY with IF 1:160 speckled with negative ASHLEY profile on HCQ.   NO SICCA symptoms. +malar type rash.      Persistent leg pains for months to years with intermittent swelling. She has since established with Dr. Dickson for EKG changes , cath fine. Did have venogram done with occlusion. In the right and left common femoral vein. She has had numerous venous US in past all negative. Having instant relief in legs.   OFF LYRICA,   No pain!!!!    She stopped HCQ for few months and noted increased joint stiffness. Better on HCQ. Restarted.   She seems to have waxing and waning aches last weekend   Vimovo for arthritis using PRN which does help  Affected joints: feet stiff hard to walk.   She has some days with entire leg that is painful. Hx of sciatic did have PT no real help. Right mostly. DDD on older MRI of the L-spine follows with Dr. Escobar at [facility].   She     She has hx of Bladder cancer with no recurrence. More recently found to have renal mass. Will be seeing MD Velazco 10/1st and 2nd/ 2017- monitoring for 1 year.    Patient deneis any hx of seizure, stroke, DVT, pericarditis, pleurisy, anemia, nephritis, leukopenia. Patient does not facial redness with slight photosensitivity (fatigue) no scarring rashes.   Dr. Bustamante-GI recent EUS with small lesion in  stomach all benign    She is under significant stress,  with leukemia.   FmHx: sister with SLE (CL also my patient)    Component      Latest Ref Rng & Units 6/30/2017   Anti Sm Antibody      0.00 - 19.99 EU 0.44   Anti-Sm Interpretation      Negative Negative   Anti-SSA Antibody      0.00 - 19.99 EU 2.61   Anti-SSA Interpretation      Negative Negative   Anti-SSB Antibody      0.00 - 19.99 EU 0.41   Anti-SSB Interpretation      Negative Negative   ds DNA Ab      Negative 1:10 Negative 1:10   Anti Sm/RNP Antibody      0.00 - 19.99 EU 1.12   Anti-Sm/RNP Interpretation      Negative Negative   CRP      0.0 - 8.2 mg/L 7.5   ASHLEY HEP-2 Titer       Positive 1:160 Speckled       REVIEW OF SYSTEMS:    Review of Systems   Constitutional: Positive for malaise/fatigue. Negative for fever and weight loss.   HENT: Negative for sore throat.    Eyes: Negative for double vision, photophobia and redness.   Respiratory: Negative for cough, shortness of breath and wheezing.    Cardiovascular: Positive for leg swelling. Negative for chest pain, palpitations and orthopnea.   Gastrointestinal: Negative for abdominal pain, constipation and diarrhea.   Genitourinary: Negative for dysuria, hematuria and urgency.   Musculoskeletal: Positive for joint pain and myalgias. Negative for back pain.   Skin: Negative for rash.   Neurological: Negative for dizziness, tingling, focal weakness and headaches.   Endo/Heme/Allergies: Does not bruise/bleed easily.   Psychiatric/Behavioral: Negative for depression, hallucinations and suicidal ideas.               Objective:            Past Medical History:   Diagnosis Date    Acid reflux     Cancer 2012     bladder; surgery 4/2012 most recent check 4/2016 clear.    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    Rheumatoid arthritis     rheumatoid      Family History   Problem Relation Age of Onset    Cancer Mother     Heart disease Father     No  Known Problems Sister     No Known Problems Brother     No Known Problems Daughter     No Known Problems Son     Cancer Maternal Aunt     Heart disease Maternal Aunt     Rheum arthritis Paternal Aunt     No Known Problems Sister     No Known Problems Sister     Osteoarthritis Sister     Lupus Sister      Social History     Tobacco Use    Smoking status: Never Smoker    Smokeless tobacco: Never Used   Substance Use Topics    Alcohol use: Not on file     Comment: wine once a week    Drug use: No         Current Outpatient Medications on File Prior to Visit   Medication Sig Dispense Refill    ARMOUR THYROID 30 mg Tab TAKE 1 TABLET BY MOUTH EVERY MORNING BEFORE A MEAL 30 tablet 0    BIOTIN ORAL Take 5,000 mg by mouth.      clopidogrel (PLAVIX) 75 mg tablet       hydroxychloroquine (PLAQUENIL) 200 mg tablet Take 1 tablet (200 mg total) by mouth 2 (two) times daily. 60 tablet 6    ibuprofen (ADVIL,MOTRIN) 800 MG tablet TK 1 T PO  TID  3    LINZESS 290 mcg Cap TK 1 C PO QD  3    metFORMIN (GLUCOPHAGE) 850 MG tablet Take 850 mg by mouth 2 (two) times daily with meals.      metoprolol succinate (TOPROL XL) 25 MG 24 hr tablet       phentermine (ADIPEX-P) 37.5 mg tablet TK 1 T PO QAM  0    rosuvastatin (CRESTOR) 20 MG tablet Take 20 mg by mouth every evening.       spironolactone (ALDACTONE) 50 MG tablet TK 1 T PO D  3    VIMOVO 500-20 mg TbID TK 1 T PO  QD PRN P  3    clopidogrel (PLAVIX) suspension       hydrOXYzine HCl (ATARAX) 10 MG Tab TAKE 3 TABLETS BY MOUTH TWICE DAILY AS NEEDED. MAY CAUSE DROWSINESS 30 tablet 0    metoprolol succinate (TOPROL-XL) 25 MG 24 hr tablet       omeprazole (PRILOSEC) 40 MG capsule TK 1 C PO QD  2    pregabalin (LYRICA) 25 MG capsule Take 25 mg by mouth 2 (two) times daily.      [DISCONTINUED] hydroxychloroquine (PLAQUENIL) 200 mg tablet TAKE 1 TABLET BY MOUTH TWICE DAILY 60 tablet 6    [DISCONTINUED] pregabalin (LYRICA) 25 MG capsule Take 1 capsule (25 mg  total) by mouth 2 (two) times daily. 60 capsule 6     No current facility-administered medications on file prior to visit.        Vitals:    03/13/19 1605   BP: 125/68   Pulse: 76       Physical Exam:    Physical Exam   Constitutional: She appears well-developed and well-nourished.   HENT:   Nose: No septal deviation.   Mouth/Throat: Mucous membranes are normal. No oral lesions.   Eyes: Pupils are equal, round, and reactive to light. Right conjunctiva is not injected. Left conjunctiva is not injected.   Neck: No JVD present. No thyroid mass and no thyromegaly present.   Cardiovascular: Normal rate, regular rhythm and normal pulses.   +edema pitting LE b/l   Pulmonary/Chest: Effort normal and breath sounds normal.   Abdominal: Soft. Normal appearance. There is no hepatosplenomegaly.   Musculoskeletal:        Right shoulder: She exhibits normal range of motion, no tenderness and no swelling.        Left shoulder: She exhibits normal range of motion, no tenderness and no swelling.        Right elbow: She exhibits normal range of motion and no swelling. No tenderness found.        Left elbow: She exhibits normal range of motion and no swelling. No tenderness found.        Right wrist: She exhibits normal range of motion, no tenderness and no swelling.        Left wrist: She exhibits normal range of motion, no tenderness and no swelling.        Right hip: She exhibits normal range of motion, normal strength and no swelling.        Left hip: She exhibits normal range of motion, no tenderness and no swelling.        Right knee: She exhibits normal range of motion and no swelling. No tenderness found.        Left knee: She exhibits normal range of motion and no swelling. No tenderness found.        Right ankle: She exhibits normal range of motion and no swelling. No tenderness.        Left ankle: She exhibits normal range of motion and no swelling. No tenderness.        Left hand: She exhibits tenderness.        Right foot:  There is tenderness.        Left foot: There is tenderness.   5/5 upper and lower extremity bilateral muscle strength   Lymphadenopathy:     She has no cervical adenopathy.     She has no axillary adenopathy.   Neurological: She has normal strength and normal reflexes.   Skin: Skin is dry and intact.   Psychiatric: She has a normal mood and affect.             Assessment:       Encounter Diagnosis   Name Primary?    Undifferentiated connective tissue disease Yes          Plan:        Undifferentiated connective tissue disease        Most consistent with UCTD   -No significant SICCA to suggest Sjogrens, UCTD she has no evidence of synovitis on exam.   -wean HCQ to once daily after 1-2 months can stop and see how this feels.        Chronic leg pain: found to have bilateral common femoral narrowing with recent stenting Dr. Dickson.       Renal mass followed in [place] and stable.       Follow-up in about 1 year (around 3/13/2020).        30min consultation with greater than 50% spent in counseling, chart review and coordination of care. All questions answered.

## 2019-04-02 ENCOUNTER — OFFICE VISIT (OUTPATIENT)
Dept: ENDOCRINOLOGY | Facility: CLINIC | Age: 64
End: 2019-04-02
Payer: MEDICARE

## 2019-04-02 VITALS
HEART RATE: 71 BPM | BODY MASS INDEX: 38.14 KG/M2 | SYSTOLIC BLOOD PRESSURE: 122 MMHG | DIASTOLIC BLOOD PRESSURE: 80 MMHG | HEIGHT: 61 IN | WEIGHT: 202 LBS

## 2019-04-02 DIAGNOSIS — E03.9 HYPOTHYROIDISM, UNSPECIFIED TYPE: Primary | ICD-10-CM

## 2019-04-02 DIAGNOSIS — R23.2 FLUSHING: ICD-10-CM

## 2019-04-02 PROCEDURE — 99214 PR OFFICE/OUTPT VISIT, EST, LEVL IV, 30-39 MIN: ICD-10-PCS | Mod: S$PBB,,, | Performed by: INTERNAL MEDICINE

## 2019-04-02 PROCEDURE — 99214 OFFICE O/P EST MOD 30 MIN: CPT | Mod: S$PBB,,, | Performed by: INTERNAL MEDICINE

## 2019-04-02 PROCEDURE — 99999 PR PBB SHADOW E&M-EST. PATIENT-LVL III: CPT | Mod: PBBFAC,,, | Performed by: INTERNAL MEDICINE

## 2019-04-02 PROCEDURE — 99213 OFFICE O/P EST LOW 20 MIN: CPT | Mod: PBBFAC,PO | Performed by: INTERNAL MEDICINE

## 2019-04-02 PROCEDURE — 99999 PR PBB SHADOW E&M-EST. PATIENT-LVL III: ICD-10-PCS | Mod: PBBFAC,,, | Performed by: INTERNAL MEDICINE

## 2019-04-02 RX ORDER — ASPIRIN 81 MG/1
81 TABLET ORAL DAILY
COMMUNITY

## 2019-04-02 NOTE — PROGRESS NOTES
CHIEF COMPLAINT: Hypothyroid  63 year old being seen as a f/u. On [place] thyroid 30 mg daily. States that had IVC stent in Dec and feeling much better since then. She is taking Biotin. She is having increased sweating for years. No palpitations. Has difficulty losing weight. She does get flushing as well          PAST MEDICAL HISTORY/PAST SURGICAL HISTORY:  Reviewed in EPIC    SOCIAL HISTORY: No T/A    FAMILY HISTORY:  Sister with hypothyroidism. + colon    MEDICATIONS/ALLERGIES: The patient's MedCard has been updated and reviewed.      ROS:   Constitutional: weight decreased  Eyes: No recent visual changes  ENT: No dysphagia  Cardiovascular: Denies current anginal symptoms  Respiratory: has chronic SOB. No change  Gastrointestinal: Denies recent bowel disturbances  GenitoUrinary - No dysuria  Skin: No new skin rash  Neurologic: No focal neurologic complaints  Remainder ROS negative        PE:    GENERAL: Well developed, well nourished.  PSYCH:  appropriate mood and affect  EYES:  PERRL, EOM intact.  ENT: Nares patent, oropharynx clear, mucosa pink,   NECK: Supple, trachea midline, no palpable thyroid nodules.   CHEST: Resp even and unlabored, CTA bilateral.  CARDIAC: RRR, S1, S2 heard, no murmurs, rubs, S3, or S4            ASSESSMENT/PLAN:  1. Hypothyroidism- Will have her hold biotin and repeat levels in 1 week to see if contributing to symptoms.     2. Flushing- see w/u below.         FOLLOWUP  1 Week- TSH, Ft4, T3  24 hour urine metanephrines, 5 HIAA  F/U 1 year- TSH, Ft4, T3

## 2019-04-15 ENCOUNTER — LAB VISIT (OUTPATIENT)
Dept: LAB | Facility: HOSPITAL | Age: 64
End: 2019-04-15
Attending: INTERNAL MEDICINE
Payer: MEDICARE

## 2019-04-15 DIAGNOSIS — E03.9 HYPOTHYROIDISM, UNSPECIFIED TYPE: ICD-10-CM

## 2019-04-15 DIAGNOSIS — R23.2 FLUSHING: ICD-10-CM

## 2019-04-15 LAB
T3 SERPL-MCNC: 71 NG/DL (ref 60–180)
T4 FREE SERPL-MCNC: 0.87 NG/DL (ref 0.71–1.51)
TSH SERPL DL<=0.005 MIU/L-ACNC: 1.96 UIU/ML (ref 0.4–4)

## 2019-04-15 PROCEDURE — 84443 ASSAY THYROID STIM HORMONE: CPT

## 2019-04-15 PROCEDURE — 84439 ASSAY OF FREE THYROXINE: CPT

## 2019-04-15 PROCEDURE — 83835 ASSAY OF METANEPHRINES: CPT

## 2019-04-15 PROCEDURE — 36415 COLL VENOUS BLD VENIPUNCTURE: CPT | Mod: PO

## 2019-04-15 PROCEDURE — 84480 ASSAY TRIIODOTHYRONINE (T3): CPT

## 2019-04-15 PROCEDURE — 83497 ASSAY OF 5-HIAA: CPT

## 2019-04-18 ENCOUNTER — TELEPHONE (OUTPATIENT)
Dept: ENDOCRINOLOGY | Facility: CLINIC | Age: 64
End: 2019-04-18

## 2019-04-18 LAB
5HIAA & CREATININE UR-IMP: NORMAL
5OH-INDOLEACETATE 24H UR-MCNC: 1.5 MG/L
5OH-INDOLEACETATE 24H UR-MRATE: 3 MG/D (ref 0–15)
5OH-INDOLEACETATE/CREAT 24H UR: 3 MG/GCR (ref 0–14)
COLLECT DURATION TIME SPEC: 24 HR
COLLECT DURATION TIME SPEC: 24 HR
CREAT 24H UR-MRATE: 1120 MG/D (ref 500–1400)
CREAT 24H UR-MRATE: 1120 MG/D (ref 500–1400)
CREAT UR-MCNC: 54 MG/DL
CREAT UR-MCNC: 54 MG/DL
METANEPH 24H UR-MCNC: 31 UG/L
METANEPH 24H UR-MRATE: 64 UG/D (ref 39–143)
METANEPH+NORMETANEPH UR-IMP: NORMAL
METANEPH/CREAT 24H UR: 57 UG/G CRT (ref 0–300)
NORMETANEPHRINE 24H UR-MCNC: 123 UG/L
NORMETANEPHRINE 24H UR-MRATE: 255 UG/D (ref 109–393)
NORMETANEPHRINE/CREAT 24H UR: 228 UG/G CRT (ref 0–400)
SPECIMEN VOL ?TM UR: 2075 ML
SPECIMEN VOL ?TM UR: 2075 ML

## 2019-04-22 NOTE — TELEPHONE ENCOUNTER
Spoke to pt and she stated she had been taking armour up until day of labs and then stopped and has not restarted awaiting results.

## 2019-04-22 NOTE — TELEPHONE ENCOUNTER
Spoke to pt and adv of Dr Napier's previous message, voiced understanding. She would like to know if she should start thyroid meds or not. Please advise.

## 2019-08-09 ENCOUNTER — PATIENT MESSAGE (OUTPATIENT)
Dept: RHEUMATOLOGY | Facility: CLINIC | Age: 64
End: 2019-08-09

## 2019-08-09 DIAGNOSIS — M19.90 INFLAMMATORY ARTHRITIS: Primary | ICD-10-CM

## 2019-08-09 DIAGNOSIS — M35.9 UNDIFFERENTIATED CONNECTIVE TISSUE DISEASE: ICD-10-CM

## 2019-08-13 ENCOUNTER — LAB VISIT (OUTPATIENT)
Dept: LAB | Facility: HOSPITAL | Age: 64
End: 2019-08-13
Attending: INTERNAL MEDICINE
Payer: MEDICARE

## 2019-08-13 DIAGNOSIS — M35.9 UNDIFFERENTIATED CONNECTIVE TISSUE DISEASE: ICD-10-CM

## 2019-08-13 DIAGNOSIS — M19.90 INFLAMMATORY ARTHRITIS: ICD-10-CM

## 2019-08-13 LAB
ALBUMIN SERPL BCP-MCNC: 3.8 G/DL (ref 3.5–5.2)
ALP SERPL-CCNC: 90 U/L (ref 55–135)
ALT SERPL W/O P-5'-P-CCNC: 20 U/L (ref 10–44)
ANION GAP SERPL CALC-SCNC: 10 MMOL/L (ref 8–16)
AST SERPL-CCNC: 18 U/L (ref 10–40)
BASOPHILS # BLD AUTO: 0.04 K/UL (ref 0–0.2)
BASOPHILS NFR BLD: 0.5 % (ref 0–1.9)
BILIRUB SERPL-MCNC: 0.2 MG/DL (ref 0.1–1)
BUN SERPL-MCNC: 22 MG/DL (ref 8–23)
CALCIUM SERPL-MCNC: 10.1 MG/DL (ref 8.7–10.5)
CHLORIDE SERPL-SCNC: 101 MMOL/L (ref 95–110)
CO2 SERPL-SCNC: 28 MMOL/L (ref 23–29)
CREAT SERPL-MCNC: 0.9 MG/DL (ref 0.5–1.4)
CRP SERPL-MCNC: 7.4 MG/L (ref 0–8.2)
DIFFERENTIAL METHOD: ABNORMAL
EOSINOPHIL # BLD AUTO: 0.1 K/UL (ref 0–0.5)
EOSINOPHIL NFR BLD: 0.9 % (ref 0–8)
ERYTHROCYTE [DISTWIDTH] IN BLOOD BY AUTOMATED COUNT: 13.2 % (ref 11.5–14.5)
ERYTHROCYTE [SEDIMENTATION RATE] IN BLOOD BY WESTERGREN METHOD: 37 MM/HR (ref 0–20)
EST. GFR  (AFRICAN AMERICAN): >60 ML/MIN/1.73 M^2
EST. GFR  (NON AFRICAN AMERICAN): >60 ML/MIN/1.73 M^2
GLUCOSE SERPL-MCNC: 88 MG/DL (ref 70–110)
HCT VFR BLD AUTO: 40.5 % (ref 37–48.5)
HGB BLD-MCNC: 12.9 G/DL (ref 12–16)
IMM GRANULOCYTES # BLD AUTO: 0.04 K/UL (ref 0–0.04)
IMM GRANULOCYTES NFR BLD AUTO: 0.5 % (ref 0–0.5)
LYMPHOCYTES # BLD AUTO: 1.7 K/UL (ref 1–4.8)
LYMPHOCYTES NFR BLD: 23.6 % (ref 18–48)
MCH RBC QN AUTO: 30 PG (ref 27–31)
MCHC RBC AUTO-ENTMCNC: 31.9 G/DL (ref 32–36)
MCV RBC AUTO: 94 FL (ref 82–98)
MONOCYTES # BLD AUTO: 0.6 K/UL (ref 0.3–1)
MONOCYTES NFR BLD: 8.1 % (ref 4–15)
NEUTROPHILS # BLD AUTO: 4.9 K/UL (ref 1.8–7.7)
NEUTROPHILS NFR BLD: 66.4 % (ref 38–73)
NRBC BLD-RTO: 0 /100 WBC
PLATELET # BLD AUTO: 275 K/UL (ref 150–350)
PMV BLD AUTO: 10.9 FL (ref 9.2–12.9)
POTASSIUM SERPL-SCNC: 4.3 MMOL/L (ref 3.5–5.1)
PROT SERPL-MCNC: 7.3 G/DL (ref 6–8.4)
RBC # BLD AUTO: 4.3 M/UL (ref 4–5.4)
SODIUM SERPL-SCNC: 139 MMOL/L (ref 136–145)
WBC # BLD AUTO: 7.38 K/UL (ref 3.9–12.7)

## 2019-08-13 PROCEDURE — 80053 COMPREHEN METABOLIC PANEL: CPT

## 2019-08-13 PROCEDURE — 85025 COMPLETE CBC W/AUTO DIFF WBC: CPT

## 2019-08-13 PROCEDURE — 86140 C-REACTIVE PROTEIN: CPT

## 2019-08-13 PROCEDURE — 85651 RBC SED RATE NONAUTOMATED: CPT | Mod: PO

## 2019-08-13 PROCEDURE — 36415 COLL VENOUS BLD VENIPUNCTURE: CPT | Mod: PO

## 2019-08-14 ENCOUNTER — OFFICE VISIT (OUTPATIENT)
Dept: RHEUMATOLOGY | Facility: CLINIC | Age: 64
End: 2019-08-14
Payer: MEDICARE

## 2019-08-14 VITALS
DIASTOLIC BLOOD PRESSURE: 67 MMHG | WEIGHT: 211.19 LBS | HEART RATE: 70 BPM | BODY MASS INDEX: 39.87 KG/M2 | SYSTOLIC BLOOD PRESSURE: 121 MMHG | HEIGHT: 61 IN

## 2019-08-14 DIAGNOSIS — M35.9 UNDIFFERENTIATED CONNECTIVE TISSUE DISEASE: Primary | ICD-10-CM

## 2019-08-14 PROCEDURE — 99999 PR PBB SHADOW E&M-EST. PATIENT-LVL III: CPT | Mod: PBBFAC,,, | Performed by: INTERNAL MEDICINE

## 2019-08-14 PROCEDURE — 99214 PR OFFICE/OUTPT VISIT, EST, LEVL IV, 30-39 MIN: ICD-10-PCS | Mod: S$PBB,,, | Performed by: INTERNAL MEDICINE

## 2019-08-14 PROCEDURE — 99213 OFFICE O/P EST LOW 20 MIN: CPT | Mod: PBBFAC,PO | Performed by: INTERNAL MEDICINE

## 2019-08-14 PROCEDURE — 99999 PR PBB SHADOW E&M-EST. PATIENT-LVL III: ICD-10-PCS | Mod: PBBFAC,,, | Performed by: INTERNAL MEDICINE

## 2019-08-14 PROCEDURE — 99214 OFFICE O/P EST MOD 30 MIN: CPT | Mod: S$PBB,,, | Performed by: INTERNAL MEDICINE

## 2019-08-14 RX ORDER — FUROSEMIDE 20 MG/1
20 TABLET ORAL DAILY
Refills: 1 | COMMUNITY
Start: 2019-07-24 | End: 2021-04-13

## 2019-08-14 RX ORDER — BUMETANIDE 1 MG/1
1 TABLET ORAL DAILY
COMMUNITY
Start: 2019-07-31

## 2019-08-14 NOTE — PROGRESS NOTES
Subjective:          Chief Complaint: Nesha Felix is a 64 y.o. female who had concerns including UCTD.    HPI:    Patient is a 62y/o female here for f/u UCTD and Raynaud's.   She Describes hx of swelling in her hands with 15-30 minutes stiffness. She notes arthralgias and myalgias that is diffuse, she notes fatigue.   She notes Raynauds mild.    She did have serologies and repeat with direct ASHLEY +, SSA +, SSB negative, Smith negative and RNP negative.   \TPO negative/Thyroglobulin ab negative  Having itching for at least 3 years. Did not see Derm but Hydroxyzine with somnolence.   Recently did have repeat ASHLEY with IF 1:160 speckled with negative ASHLEY profile on HCQ.   NO SICCA symptoms. +malar type rash.     Working with Dr. Dickson s/p venous stenting.   OFF LYRICA,   No pain!!!!    She stopped HCQ for few months and noted increased joint stiffness. Better on HCQ. Restarted.   She seems to have waxing and waning aches last weekend   Vimovo for arthritis using PRN which does help  Affected joints: feet stiff hard to walk.   She has some days with entire leg that is painful. Hx of sciatic did have PT no real help. Right mostly. DDD on older MRI of the L-spine follows with Dr. Escobar at [facility].   She     She has hx of Bladder cancer with no recurrence. More recently found to have renal mass. Will be seeing MD Velazco 10/1st and 2nd/ 2017- monitoring for 1 year.    Patient deneis any hx of seizure, stroke, DVT, pericarditis, pleurisy, anemia, nephritis, leukopenia. Patient does not facial redness with slight photosensitivity (fatigue) no scarring rashes.   Dr. Bustamante-GI recent EUS with small lesion in stomach all benign    She is under significant stress,  with leukemia.   FmHx: sister with SLE (CL also my patient)    Component      Latest Ref Rng & Units 6/30/2017   Anti Sm Antibody      0.00 - 19.99 EU 0.44   Anti-Sm Interpretation      Negative Negative   Anti-SSA Antibody      0.00 - 19.99 EU 2.61   Anti-SSA  Interpretation      Negative Negative   Anti-SSB Antibody      0.00 - 19.99 EU 0.41   Anti-SSB Interpretation      Negative Negative   ds DNA Ab      Negative 1:10 Negative 1:10   Anti Sm/RNP Antibody      0.00 - 19.99 EU 1.12   Anti-Sm/RNP Interpretation      Negative Negative   CRP      0.0 - 8.2 mg/L 7.5   ASHLEY HEP-2 Titer       Positive 1:160 Speckled       REVIEW OF SYSTEMS:    Review of Systems   Constitutional: Positive for malaise/fatigue. Negative for fever and weight loss.   HENT: Negative for sore throat.    Eyes: Negative for double vision, photophobia and redness.   Respiratory: Negative for cough, shortness of breath and wheezing.    Cardiovascular: Positive for leg swelling. Negative for chest pain, palpitations and orthopnea.   Gastrointestinal: Negative for abdominal pain, constipation and diarrhea.   Genitourinary: Negative for dysuria, hematuria and urgency.   Musculoskeletal: Positive for joint pain and myalgias. Negative for back pain.   Skin: Negative for rash.   Neurological: Negative for dizziness, tingling, focal weakness and headaches.   Endo/Heme/Allergies: Does not bruise/bleed easily.   Psychiatric/Behavioral: Negative for depression, hallucinations and suicidal ideas.               Objective:            Past Medical History:   Diagnosis Date    Acid reflux     Cancer 2012     bladder; surgery 4/2012 most recent check 4/2016 clear.    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    Rheumatoid arthritis     rheumatoid      Family History   Problem Relation Age of Onset    Cancer Mother     Heart disease Father     No Known Problems Sister     No Known Problems Brother     No Known Problems Daughter     No Known Problems Son     Cancer Maternal Aunt     Heart disease Maternal Aunt     Rheum arthritis Paternal Aunt     No Known Problems Sister     No Known Problems Sister     Osteoarthritis Sister     Lupus Sister      Social  History     Tobacco Use    Smoking status: Never Smoker    Smokeless tobacco: Never Used   Substance Use Topics    Alcohol use: Not on file     Comment: wine once a week    Drug use: No         Current Outpatient Medications on File Prior to Visit   Medication Sig Dispense Refill    ARMOUR THYROID 30 mg Tab TAKE 1 TABLET BY MOUTH EVERY MORNING BEFORE A MEAL 30 tablet 0    aspirin (ECOTRIN) 81 MG EC tablet Take 81 mg by mouth once daily.      BIOTIN ORAL Take 10,000 mg by mouth.       bumetanide (BUMEX) 1 MG tablet       hydroxychloroquine (PLAQUENIL) 200 mg tablet Take 1 tablet (200 mg total) by mouth 2 (two) times daily. 60 tablet 6    ibuprofen (ADVIL,MOTRIN) 800 MG tablet TK 1 T PO  TID  3    metFORMIN (GLUCOPHAGE) 850 MG tablet Take 850 mg by mouth 2 (two) times daily with meals.      metoprolol succinate (TOPROL XL) 25 MG 24 hr tablet       rosuvastatin (CRESTOR) 20 MG tablet Take 20 mg by mouth every evening.       spironolactone (ALDACTONE) 50 MG tablet TK 1 T PO D  3    VIMOVO 500-20 mg TbID TK 1 T PO  QD PRN P  3    furosemide (LASIX) 20 MG tablet Take 20 mg by mouth once daily.  1    LINZESS 290 mcg Cap TK 1 C PO QD  3    phentermine (ADIPEX-P) 37.5 mg tablet TK 1 T PO QAM  0    [DISCONTINUED] clopidogrel (PLAVIX) 75 mg tablet        No current facility-administered medications on file prior to visit.        Vitals:    08/14/19 1601   BP: 121/67   Pulse: 70       Physical Exam:    Physical Exam   Constitutional: She appears well-developed and well-nourished.   HENT:   Nose: No septal deviation.   Mouth/Throat: Mucous membranes are normal. No oral lesions.   Eyes: Pupils are equal, round, and reactive to light. Right conjunctiva is not injected. Left conjunctiva is not injected.   Neck: No JVD present. No thyroid mass and no thyromegaly present.   Cardiovascular: Normal rate, regular rhythm and normal pulses.   +edema pitting LE b/l   Pulmonary/Chest: Effort normal and breath sounds  normal.   Abdominal: Soft. Normal appearance. There is no hepatosplenomegaly.   Musculoskeletal:        Right shoulder: She exhibits normal range of motion, no tenderness and no swelling.        Left shoulder: She exhibits normal range of motion, no tenderness and no swelling.        Right elbow: She exhibits normal range of motion and no swelling. No tenderness found.        Left elbow: She exhibits normal range of motion and no swelling. No tenderness found.        Right wrist: She exhibits normal range of motion, no tenderness and no swelling.        Left wrist: She exhibits normal range of motion, no tenderness and no swelling.        Right hip: She exhibits normal range of motion, normal strength and no swelling.        Left hip: She exhibits normal range of motion, no tenderness and no swelling.        Right knee: She exhibits normal range of motion and no swelling. No tenderness found.        Left knee: She exhibits normal range of motion and no swelling. No tenderness found.        Right ankle: She exhibits normal range of motion and no swelling. No tenderness.        Left ankle: She exhibits normal range of motion and no swelling. No tenderness.        Left hand: She exhibits tenderness.        Right foot: There is tenderness.        Left foot: There is tenderness.   5/5 upper and lower extremity bilateral muscle strength   Lymphadenopathy:     She has no cervical adenopathy.     She has no axillary adenopathy.   Neurological: She has normal strength and normal reflexes.   Skin: Skin is dry and intact.   Psychiatric: She has a normal mood and affect.             Assessment:       Encounter Diagnosis   Name Primary?    Undifferentiated connective tissue disease Yes          Plan:        Undifferentiated connective tissue disease        Most consistent with UCTD   -No significant SICCA to suggest Sjogrens, UCTD she has no evidence of synovitis on exam.   -HCQ 200mg BID.         Chronic leg pain: found to  have bilateral common femoral narrowing with recent stenting Dr. Dickson.       Renal mass followed in [place] and stable.       Follow up in about 4 months (around 12/14/2019).        30min consultation with greater than 50% spent in counseling, chart review and coordination of care. All questions answered.

## 2019-09-07 DIAGNOSIS — M35.9 UNDIFFERENTIATED CONNECTIVE TISSUE DISEASE: ICD-10-CM

## 2019-09-09 RX ORDER — HYDROXYCHLOROQUINE SULFATE 200 MG/1
TABLET, FILM COATED ORAL
Qty: 60 TABLET | Refills: 5 | Status: SHIPPED | OUTPATIENT
Start: 2019-09-09 | End: 2020-03-31

## 2020-01-31 ENCOUNTER — PATIENT MESSAGE (OUTPATIENT)
Dept: RHEUMATOLOGY | Facility: CLINIC | Age: 65
End: 2020-01-31

## 2020-02-04 ENCOUNTER — PATIENT MESSAGE (OUTPATIENT)
Dept: RHEUMATOLOGY | Facility: CLINIC | Age: 65
End: 2020-02-04

## 2020-02-13 ENCOUNTER — OFFICE VISIT (OUTPATIENT)
Dept: RHEUMATOLOGY | Facility: CLINIC | Age: 65
End: 2020-02-13
Payer: MEDICARE

## 2020-02-13 VITALS
DIASTOLIC BLOOD PRESSURE: 67 MMHG | WEIGHT: 209.44 LBS | HEIGHT: 61 IN | BODY MASS INDEX: 39.54 KG/M2 | SYSTOLIC BLOOD PRESSURE: 136 MMHG | HEART RATE: 73 BPM

## 2020-02-13 DIAGNOSIS — M70.61 GREATER TROCHANTERIC BURSITIS OF BOTH HIPS: ICD-10-CM

## 2020-02-13 DIAGNOSIS — I87.2 VENOUS (PERIPHERAL) INSUFFICIENCY: ICD-10-CM

## 2020-02-13 DIAGNOSIS — M35.9 UNDIFFERENTIATED CONNECTIVE TISSUE DISEASE: Primary | ICD-10-CM

## 2020-02-13 DIAGNOSIS — M70.62 GREATER TROCHANTERIC BURSITIS OF BOTH HIPS: ICD-10-CM

## 2020-02-13 PROCEDURE — 99214 PR OFFICE/OUTPT VISIT, EST, LEVL IV, 30-39 MIN: ICD-10-PCS | Mod: S$PBB,,, | Performed by: INTERNAL MEDICINE

## 2020-02-13 PROCEDURE — 99214 OFFICE O/P EST MOD 30 MIN: CPT | Mod: S$PBB,,, | Performed by: INTERNAL MEDICINE

## 2020-02-13 PROCEDURE — 99999 PR PBB SHADOW E&M-EST. PATIENT-LVL III: CPT | Mod: PBBFAC,,, | Performed by: INTERNAL MEDICINE

## 2020-02-13 PROCEDURE — 99213 OFFICE O/P EST LOW 20 MIN: CPT | Mod: PBBFAC,PO | Performed by: INTERNAL MEDICINE

## 2020-02-13 PROCEDURE — 99999 PR PBB SHADOW E&M-EST. PATIENT-LVL III: ICD-10-PCS | Mod: PBBFAC,,, | Performed by: INTERNAL MEDICINE

## 2020-02-13 NOTE — PROGRESS NOTES
Subjective:          Chief Complaint: Nesha Felix is a 64 y.o. female who had no chief complaint listed for this encounter.    HPI:    Patient is a 62y/o female here for f/u UCTD and Raynaud's.   She Describes hx of swelling in her hands with 15-30 minutes stiffness. She notes arthralgias and myalgias that is diffuse, she notes fatigue.   She notes Raynauds mild.    She did have serologies and repeat with direct ASHLEY +, SSA +, SSB negative, Smith negative and RNP negative.   \TPO negative/Thyroglobulin ab negative  Having itching for at least 3 years. Did not see Derm but Hydroxyzine with somnolence.   Recently did have repeat ASHLEY with IF 1:160 speckled with negative ASHLEY profile on HCQ.   NO SICCA symptoms. +malar type rash.     Working with Dr. Dickson s/p venous stenting.   OFF LYRICA,   Some pain at the right GTB and legs returning recently, mild. No edema. Will be seeing Anita Dickson,     She stopped HCQ for few months and noted increased joint stiffness. Better on HCQ. Restarted.   Due for eye exam:   She seems to have waxing and waning aches last weekend   Vimovo for arthritis using PRN which does help  Affected joints: feet stiff hard to walk.   She has some days with entire leg that is painful. Hx of sciatic did have PT no real help. Right mostly. DDD on older MRI of the L-spine follows with Dr. Escobar at [facility].   She     She has hx of Bladder cancer with no recurrence. More recently found to have renal mass. Will be seeing MD Velazco 10/1st and 2nd/ 2017- monitoring for 1 year.    Patient deneis any hx of seizure, stroke, DVT, pericarditis, pleurisy, anemia, nephritis, leukopenia. Patient does not facial redness with slight photosensitivity (fatigue) no scarring rashes.   Dr. Bustamante-GI recent EUS with small lesion in stomach all benign    She is under significant stress,  with leukemia.   FmHx: sister with SLE (CL also my patient)    Component      Latest Ref Rng & Units 6/30/2017   Anti Sm Antibody       0.00 - 19.99 EU 0.44   Anti-Sm Interpretation      Negative Negative   Anti-SSA Antibody      0.00 - 19.99 EU 2.61   Anti-SSA Interpretation      Negative Negative   Anti-SSB Antibody      0.00 - 19.99 EU 0.41   Anti-SSB Interpretation      Negative Negative   ds DNA Ab      Negative 1:10 Negative 1:10   Anti Sm/RNP Antibody      0.00 - 19.99 EU 1.12   Anti-Sm/RNP Interpretation      Negative Negative   CRP      0.0 - 8.2 mg/L 7.5   ASHLEY HEP-2 Titer       Positive 1:160 Speckled       REVIEW OF SYSTEMS:    Review of Systems   Constitutional: Positive for malaise/fatigue. Negative for fever and weight loss.   HENT: Negative for sore throat.    Eyes: Negative for double vision, photophobia and redness.   Respiratory: Negative for cough, shortness of breath and wheezing.    Cardiovascular: Positive for leg swelling. Negative for chest pain, palpitations and orthopnea.   Gastrointestinal: Negative for abdominal pain, constipation and diarrhea.   Genitourinary: Negative for dysuria, hematuria and urgency.   Musculoskeletal: Positive for joint pain and myalgias. Negative for back pain.   Skin: Negative for rash.   Neurological: Negative for dizziness, tingling, focal weakness and headaches.   Endo/Heme/Allergies: Does not bruise/bleed easily.   Psychiatric/Behavioral: Negative for depression, hallucinations and suicidal ideas.               Objective:            Past Medical History:   Diagnosis Date    Acid reflux     Cancer 2012     bladder; surgery 4/2012 most recent check 4/2016 clear.    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    Rheumatoid arthritis     rheumatoid      Family History   Problem Relation Age of Onset    Cancer Mother     Heart disease Father     No Known Problems Sister     No Known Problems Brother     No Known Problems Daughter     No Known Problems Son     Cancer Maternal Aunt     Heart disease Maternal Aunt     Rheum arthritis Paternal  Aunt     No Known Problems Sister     No Known Problems Sister     Osteoarthritis Sister     Lupus Sister      Social History     Tobacco Use    Smoking status: Never Smoker    Smokeless tobacco: Never Used   Substance Use Topics    Alcohol use: Not on file     Comment: wine once a week    Drug use: No         Current Outpatient Medications on File Prior to Visit   Medication Sig Dispense Refill    ARMOUR THYROID 30 mg Tab TAKE 1 TABLET BY MOUTH EVERY MORNING BEFORE A MEAL 30 tablet 0    aspirin (ECOTRIN) 81 MG EC tablet Take 81 mg by mouth once daily.      BIOTIN ORAL Take 10,000 mg by mouth.       bumetanide (BUMEX) 1 MG tablet Take 1 mg by mouth once daily.       hydroxychloroquine (PLAQUENIL) 200 mg tablet TAKE 1 TABLET BY MOUTH TWICE DAILY 60 tablet 5    ibuprofen (ADVIL,MOTRIN) 800 MG tablet TK 1 T PO  TID  3    metFORMIN (GLUCOPHAGE) 850 MG tablet Take 850 mg by mouth 2 (two) times daily with meals.      metoprolol succinate (TOPROL XL) 25 MG 24 hr tablet       phentermine (ADIPEX-P) 37.5 mg tablet TK 1 T PO QAM  0    rosuvastatin (CRESTOR) 20 MG tablet Take 20 mg by mouth every evening.       spironolactone (ALDACTONE) 50 MG tablet TK 1 T PO D  3    furosemide (LASIX) 20 MG tablet Take 20 mg by mouth once daily.  1    LINZESS 290 mcg Cap TK 1 C PO QD  3    VIMOVO 500-20 mg TbID TK 1 T PO  QD PRN P  3     No current facility-administered medications on file prior to visit.        Vitals:    02/13/20 1610   BP: 136/67   Pulse: 73       Physical Exam:    Physical Exam   Constitutional: She appears well-developed and well-nourished.   HENT:   Nose: No septal deviation.   Mouth/Throat: Mucous membranes are normal. No oral lesions.   Eyes: Pupils are equal, round, and reactive to light. Right conjunctiva is not injected. Left conjunctiva is not injected.   Neck: No JVD present. No thyroid mass and no thyromegaly present.   Cardiovascular: Normal rate, regular rhythm and normal pulses.    +edema pitting LE b/l   Pulmonary/Chest: Effort normal and breath sounds normal.   Abdominal: Soft. Normal appearance. There is no hepatosplenomegaly.   Musculoskeletal:        Right shoulder: She exhibits normal range of motion, no tenderness and no swelling.        Left shoulder: She exhibits normal range of motion, no tenderness and no swelling.        Right elbow: She exhibits normal range of motion and no swelling. No tenderness found.        Left elbow: She exhibits normal range of motion and no swelling. No tenderness found.        Right wrist: She exhibits normal range of motion, no tenderness and no swelling.        Left wrist: She exhibits normal range of motion, no tenderness and no swelling.        Right hip: She exhibits normal range of motion, normal strength and no swelling.        Left hip: She exhibits normal range of motion, no tenderness and no swelling.        Right knee: She exhibits normal range of motion and no swelling. No tenderness found.        Left knee: She exhibits normal range of motion and no swelling. No tenderness found.        Right ankle: She exhibits normal range of motion and no swelling. No tenderness.        Left ankle: She exhibits normal range of motion and no swelling. No tenderness.        Left hand: She exhibits tenderness.        Right foot: There is tenderness.        Left foot: There is tenderness.   5/5 upper and lower extremity bilateral muscle strength   Lymphadenopathy:     She has no cervical adenopathy.     She has no axillary adenopathy.   Neurological: She has normal strength and normal reflexes.   Skin: Skin is dry and intact.   Psychiatric: She has a normal mood and affect.             Assessment:       Encounter Diagnoses   Name Primary?    Undifferentiated connective tissue disease Yes    Venous (peripheral) insufficiency     Greater trochanteric bursitis of both hips           Plan:        Undifferentiated connective tissue disease    Venous  (peripheral) insufficiency    Greater trochanteric bursitis of both hips        Most consistent with UCTD   -No significant SICCA to suggest Sjogrens, UCTD she has no evidence of synovitis on exam.   -HCQ 200mg BID.     -will need eye exam this spring 2020       Chronic leg pain: found to have bilateral common femoral narrowing with recent stenting Dr. Dickson. Some return of aches and pains, still better. Handout for GTB exercises and will speak with Dr. Dickson soon.     Renal mass followed in [place] and stable.       Follow up in about 4 months (around 6/13/2020).        30min consultation with greater than 50% spent in counseling, chart review and coordination of care. All questions answered.

## 2020-02-14 ENCOUNTER — PATIENT MESSAGE (OUTPATIENT)
Dept: RHEUMATOLOGY | Facility: CLINIC | Age: 65
End: 2020-02-14

## 2020-03-19 ENCOUNTER — PATIENT MESSAGE (OUTPATIENT)
Dept: RHEUMATOLOGY | Facility: CLINIC | Age: 65
End: 2020-03-19

## 2020-03-30 DIAGNOSIS — M35.9 UNDIFFERENTIATED CONNECTIVE TISSUE DISEASE: ICD-10-CM

## 2020-03-31 RX ORDER — HYDROXYCHLOROQUINE SULFATE 200 MG/1
TABLET, FILM COATED ORAL
Qty: 180 TABLET | Refills: 3 | Status: SHIPPED | OUTPATIENT
Start: 2020-03-31 | End: 2021-03-16

## 2020-08-17 ENCOUNTER — OFFICE VISIT (OUTPATIENT)
Dept: RHEUMATOLOGY | Facility: CLINIC | Age: 65
End: 2020-08-17
Payer: MEDICARE

## 2020-08-17 VITALS — WEIGHT: 208 LBS | BODY MASS INDEX: 39.3 KG/M2

## 2020-08-17 DIAGNOSIS — Z79.899 LONG-TERM USE OF PLAQUENIL: ICD-10-CM

## 2020-08-17 DIAGNOSIS — M35.9 UNDIFFERENTIATED CONNECTIVE TISSUE DISEASE: Primary | ICD-10-CM

## 2020-08-17 PROCEDURE — 99214 PR OFFICE/OUTPT VISIT, EST, LEVL IV, 30-39 MIN: ICD-10-PCS | Mod: 95,,, | Performed by: INTERNAL MEDICINE

## 2020-08-17 PROCEDURE — 99214 OFFICE O/P EST MOD 30 MIN: CPT | Mod: 95,,, | Performed by: INTERNAL MEDICINE

## 2020-08-17 NOTE — PROGRESS NOTES
Subjective:          Chief Complaint: Nesha Felix is a 65 y.o. female who had concerns including Disease Management.    HPI:    Patient is a 60y/o female here for f/u UCTD and Raynaud's.   She Describes hx of swelling in her hands with 15-30 minutes stiffness. She notes arthralgias and myalgias that is diffuse, she notes fatigue.   She notes Raynauds mild.    She did have serologies and repeat with direct ASHLEY +, SSA +, SSB negative, Smith negative and RNP negative.   \TPO negative/Thyroglobulin ab negative  Having itching for at least 3 years. Did not see Derm but Hydroxyzine with somnolence.   Recently did have repeat ASHLEY with IF 1:160 speckled with negative ASHLEY profile on HCQ.   NO SICCA symptoms. +malar type rash.     Working with Dr. Dickson s/p venous stenting.   OFF LYRICA,   Some pain at the right GTB and legs returning recently, mild. No edema. Will be seeing Anita Dickson,     She stopped HCQ for few months and noted increased joint stiffness. Better on HCQ. Restarted.   Due for eye exam:   She seems to have waxing and waning aches last weekend   Vimovo for arthritis using PRN which does help  Affected joints: feet stiff hard to walk.   She has some days with entire leg that is painful. Hx of sciatic did have PT no real help. Right mostly. DDD on older MRI of the L-spine follows with Dr. Escobar at [facility].   She     She has hx of Bladder cancer with no recurrence. More recently found to have renal mass. Will be seeing MD Velazco 10/1st and 2nd/ 2017- monitoring for 1 year.    Patient deneis any hx of seizure, stroke, DVT, pericarditis, pleurisy, anemia, nephritis, leukopenia. Patient does not facial redness with slight photosensitivity (fatigue) no scarring rashes.   Dr. Bustamante-GI recent EUS with small lesion in stomach all benign    She is under significant stress,  with leukemia.   FmHx: sister with SLE (CL also my patient)    Component      Latest Ref Rng & Units 6/30/2017   Anti Sm Antibody      0.00 -  19.99 EU 0.44   Anti-Sm Interpretation      Negative Negative   Anti-SSA Antibody      0.00 - 19.99 EU 2.61   Anti-SSA Interpretation      Negative Negative   Anti-SSB Antibody      0.00 - 19.99 EU 0.41   Anti-SSB Interpretation      Negative Negative   ds DNA Ab      Negative 1:10 Negative 1:10   Anti Sm/RNP Antibody      0.00 - 19.99 EU 1.12   Anti-Sm/RNP Interpretation      Negative Negative   CRP      0.0 - 8.2 mg/L 7.5   ASHLEY HEP-2 Titer       Positive 1:160 Speckled       REVIEW OF SYSTEMS:    Review of Systems   Constitutional: Positive for malaise/fatigue. Negative for fever and weight loss.   HENT: Negative for sore throat.    Eyes: Negative for double vision, photophobia and redness.   Respiratory: Negative for cough, shortness of breath and wheezing.    Cardiovascular: Positive for leg swelling. Negative for chest pain, palpitations and orthopnea.   Gastrointestinal: Negative for abdominal pain, constipation and diarrhea.   Genitourinary: Negative for dysuria, hematuria and urgency.   Musculoskeletal: Positive for joint pain and myalgias. Negative for back pain.   Skin: Negative for rash.   Neurological: Negative for dizziness, tingling, focal weakness and headaches.   Endo/Heme/Allergies: Does not bruise/bleed easily.   Psychiatric/Behavioral: Negative for depression, hallucinations and suicidal ideas.               Objective:            Past Medical History:   Diagnosis Date    Acid reflux     Cancer 2012     bladder; surgery 4/2012 most recent check 4/2016 clear.    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    Rheumatoid arthritis     rheumatoid      Family History   Problem Relation Age of Onset    Cancer Mother     Heart disease Father     No Known Problems Sister     No Known Problems Brother     No Known Problems Daughter     No Known Problems Son     Cancer Maternal Aunt     Heart disease Maternal Aunt     Rheum arthritis Paternal Aunt      No Known Problems Sister     No Known Problems Sister     Osteoarthritis Sister     Lupus Sister      Social History     Tobacco Use    Smoking status: Never Smoker    Smokeless tobacco: Never Used   Substance Use Topics    Alcohol use: Not on file     Comment: wine once a week    Drug use: No         Current Outpatient Medications on File Prior to Visit   Medication Sig Dispense Refill    BIOTIN ORAL Take 10,000 mg by mouth.       hydroxychloroquine (PLAQUENIL) 200 mg tablet Take 1 tablet by mouth twice daily 180 tablet 3    ibuprofen (ADVIL,MOTRIN) 800 MG tablet TK 1 T PO  TID  3    metFORMIN (GLUCOPHAGE) 850 MG tablet Take 850 mg by mouth 2 (two) times daily with meals.      metoprolol succinate (TOPROL XL) 25 MG 24 hr tablet       rosuvastatin (CRESTOR) 20 MG tablet Take 20 mg by mouth every evening.       spironolactone (ALDACTONE) 50 MG tablet TK 1 T PO D  3    ARMOUR THYROID 30 mg Tab TAKE 1 TABLET BY MOUTH EVERY MORNING BEFORE A MEAL 30 tablet 0    aspirin (ECOTRIN) 81 MG EC tablet Take 81 mg by mouth once daily.      bumetanide (BUMEX) 1 MG tablet Take 1 mg by mouth once daily.       furosemide (LASIX) 20 MG tablet Take 20 mg by mouth once daily.  1    LINZESS 290 mcg Cap TK 1 C PO QD  3    phentermine (ADIPEX-P) 37.5 mg tablet TK 1 T PO QAM  0    VIMOVO 500-20 mg TbID TK 1 T PO  QD PRN P  3     No current facility-administered medications on file prior to visit.        There were no vitals filed for this visit.    Physical Exam:    Physical Exam  Constitutional:       Appearance: She is well-developed.   Eyes:      General: Lids are normal.   Neurological:      Mental Status: She is oriented to person, place, and time.   Psychiatric:         Behavior: Behavior normal.         Thought Content: Thought content normal.               Assessment:       Encounter Diagnosis   Name Primary?    Undifferentiated connective tissue disease Yes          Plan:        Undifferentiated connective  tissue disease  -     CBC auto differential; Standing; Expected date: 08/17/2020  -     Comprehensive metabolic panel; Standing; Expected date: 08/17/2020  -     Sedimentation rate; Standing; Expected date: 08/17/2020  -     C-Reactive Protein; Standing; Expected date: 08/17/2020  -     Sedimentation rate; Future; Expected date: 08/17/2020  -     C-Reactive Protein; Standing; Expected date: 08/17/2020    Long-term use of Plaquenil        Most consistent with UCTD   -No significant SICCA to suggest Sjogrens, UCTD she has no evidence of synovitis on exam.   -HCQ 200mg BID.     -will need eye exam this spring 2020       Chronic leg pain: found to have bilateral common femoral narrowing with recent stenting Dr. Dickson. Some return of aches and pains, still better.     Renal mass followed in [place] and stable.       No follow-ups on file.        30min consultation with greater than 50% spent in counseling, chart review and coordination of care. All questions answered.    The patient location is: Home [place]  The chief complaint leading to consultation is: medication management and f/u   Visit type: Virtual visit with synchronous audio and video  Total time spent with patient: 30  Each patient to whom he or she provides medical services by telemedicine is:  (1) informed of the relationship between the physician and patient and the respective role of any other health care provider with respect to management of the patient; and (2) notified that he or she may decline to receive medical services by telemedicine and may withdraw from such care at any time.    Notes:   As above.

## 2020-11-23 ENCOUNTER — PATIENT MESSAGE (OUTPATIENT)
Dept: RHEUMATOLOGY | Facility: CLINIC | Age: 65
End: 2020-11-23

## 2020-11-24 ENCOUNTER — PATIENT MESSAGE (OUTPATIENT)
Dept: RHEUMATOLOGY | Facility: CLINIC | Age: 65
End: 2020-11-24

## 2021-03-16 ENCOUNTER — PATIENT MESSAGE (OUTPATIENT)
Dept: RHEUMATOLOGY | Facility: CLINIC | Age: 66
End: 2021-03-16

## 2021-04-01 ENCOUNTER — OFFICE VISIT (OUTPATIENT)
Dept: URGENT CARE | Facility: CLINIC | Age: 66
End: 2021-04-01
Payer: MEDICARE

## 2021-04-01 VITALS
OXYGEN SATURATION: 97 % | HEART RATE: 74 BPM | TEMPERATURE: 98 F | SYSTOLIC BLOOD PRESSURE: 128 MMHG | DIASTOLIC BLOOD PRESSURE: 72 MMHG | BODY MASS INDEX: 40.97 KG/M2 | HEIGHT: 61 IN | RESPIRATION RATE: 18 BRPM | WEIGHT: 217 LBS

## 2021-04-01 DIAGNOSIS — M54.9 BACK PAIN, UNSPECIFIED BACK LOCATION, UNSPECIFIED BACK PAIN LATERALITY, UNSPECIFIED CHRONICITY: Primary | ICD-10-CM

## 2021-04-01 DIAGNOSIS — M54.9 DORSALGIA, UNSPECIFIED: ICD-10-CM

## 2021-04-01 LAB
BILIRUB UR QL STRIP: NEGATIVE
GLUCOSE UR QL STRIP: NEGATIVE
KETONES UR QL STRIP: NEGATIVE
LEUKOCYTE ESTERASE UR QL STRIP: NEGATIVE
PH, POC UA: 5 (ref 5–8)
POC BLOOD, URINE: NEGATIVE
POC NITRATES, URINE: NEGATIVE
PROT UR QL STRIP: NEGATIVE
SP GR UR STRIP: 1.01 (ref 1–1.03)
UROBILINOGEN UR STRIP-ACNC: ABNORMAL (ref 0.1–1.1)

## 2021-04-01 PROCEDURE — 99203 OFFICE O/P NEW LOW 30 MIN: CPT | Mod: 25,S$GLB,, | Performed by: FAMILY MEDICINE

## 2021-04-01 PROCEDURE — 96372 THER/PROPH/DIAG INJ SC/IM: CPT | Mod: S$GLB,,, | Performed by: FAMILY MEDICINE

## 2021-04-01 PROCEDURE — 81003 POCT URINALYSIS, DIPSTICK, AUTOMATED, W/O SCOPE: ICD-10-PCS | Mod: QW,S$GLB,, | Performed by: FAMILY MEDICINE

## 2021-04-01 PROCEDURE — 72100 XR LUMBAR SPINE 2 OR 3 VIEWS: ICD-10-PCS | Mod: S$GLB,,, | Performed by: RADIOLOGY

## 2021-04-01 PROCEDURE — 99203 PR OFFICE/OUTPT VISIT, NEW, LEVL III, 30-44 MIN: ICD-10-PCS | Mod: 25,S$GLB,, | Performed by: FAMILY MEDICINE

## 2021-04-01 PROCEDURE — 72100 X-RAY EXAM L-S SPINE 2/3 VWS: CPT | Mod: S$GLB,,, | Performed by: RADIOLOGY

## 2021-04-01 PROCEDURE — 96372 PR INJECTION,THERAP/PROPH/DIAG2ST, IM OR SUBCUT: ICD-10-PCS | Mod: S$GLB,,, | Performed by: FAMILY MEDICINE

## 2021-04-01 PROCEDURE — 81003 URINALYSIS AUTO W/O SCOPE: CPT | Mod: QW,S$GLB,, | Performed by: FAMILY MEDICINE

## 2021-04-01 RX ORDER — METHOCARBAMOL 500 MG/1
500 TABLET, FILM COATED ORAL 2 TIMES DAILY PRN
Qty: 30 TABLET | Refills: 0 | Status: SHIPPED | OUTPATIENT
Start: 2021-04-01 | End: 2021-04-11

## 2021-04-01 RX ORDER — BETAMETHASONE SODIUM PHOSPHATE AND BETAMETHASONE ACETATE 3; 3 MG/ML; MG/ML
6 INJECTION, SUSPENSION INTRA-ARTICULAR; INTRALESIONAL; INTRAMUSCULAR; SOFT TISSUE ONCE
Status: COMPLETED | OUTPATIENT
Start: 2021-04-01 | End: 2021-04-01

## 2021-04-01 RX ORDER — NAPROXEN 500 MG/1
500 TABLET ORAL 2 TIMES DAILY WITH MEALS
Qty: 30 TABLET | Refills: 0 | Status: SHIPPED | OUTPATIENT
Start: 2021-04-01 | End: 2022-04-01

## 2021-04-01 RX ADMIN — BETAMETHASONE SODIUM PHOSPHATE AND BETAMETHASONE ACETATE 6 MG: 3; 3 INJECTION, SUSPENSION INTRA-ARTICULAR; INTRALESIONAL; INTRAMUSCULAR; SOFT TISSUE at 12:04

## 2021-04-07 ENCOUNTER — OFFICE VISIT (OUTPATIENT)
Dept: SPINE | Facility: CLINIC | Age: 66
End: 2021-04-07
Payer: MEDICARE

## 2021-04-07 VITALS
HEART RATE: 74 BPM | HEIGHT: 61 IN | DIASTOLIC BLOOD PRESSURE: 64 MMHG | WEIGHT: 211.56 LBS | BODY MASS INDEX: 39.94 KG/M2 | SYSTOLIC BLOOD PRESSURE: 113 MMHG

## 2021-04-07 DIAGNOSIS — M54.50 ACUTE RIGHT-SIDED LOW BACK PAIN WITHOUT SCIATICA: Primary | ICD-10-CM

## 2021-04-07 PROCEDURE — 99999 PR PBB SHADOW E&M-EST. PATIENT-LVL III: ICD-10-PCS | Mod: PBBFAC,,, | Performed by: PHYSICIAN ASSISTANT

## 2021-04-07 PROCEDURE — 99203 OFFICE O/P NEW LOW 30 MIN: CPT | Mod: S$PBB,,, | Performed by: PHYSICIAN ASSISTANT

## 2021-04-07 PROCEDURE — 99203 PR OFFICE/OUTPT VISIT, NEW, LEVL III, 30-44 MIN: ICD-10-PCS | Mod: S$PBB,,, | Performed by: PHYSICIAN ASSISTANT

## 2021-04-07 PROCEDURE — 99999 PR PBB SHADOW E&M-EST. PATIENT-LVL III: CPT | Mod: PBBFAC,,, | Performed by: PHYSICIAN ASSISTANT

## 2021-04-07 PROCEDURE — 99213 OFFICE O/P EST LOW 20 MIN: CPT | Mod: PBBFAC,PN | Performed by: PHYSICIAN ASSISTANT

## 2021-04-07 RX ORDER — CLOPIDOGREL BISULFATE 75 MG/1
75 TABLET ORAL DAILY
COMMUNITY
Start: 2021-03-03

## 2021-04-13 ENCOUNTER — OFFICE VISIT (OUTPATIENT)
Dept: RHEUMATOLOGY | Facility: CLINIC | Age: 66
End: 2021-04-13
Payer: MEDICARE

## 2021-04-13 VITALS
HEIGHT: 61 IN | BODY MASS INDEX: 39.52 KG/M2 | HEART RATE: 62 BPM | DIASTOLIC BLOOD PRESSURE: 64 MMHG | WEIGHT: 209.31 LBS | SYSTOLIC BLOOD PRESSURE: 133 MMHG

## 2021-04-13 DIAGNOSIS — M35.9 UNDIFFERENTIATED CONNECTIVE TISSUE DISEASE: Primary | ICD-10-CM

## 2021-04-13 PROCEDURE — 99214 OFFICE O/P EST MOD 30 MIN: CPT | Mod: S$PBB,,, | Performed by: INTERNAL MEDICINE

## 2021-04-13 PROCEDURE — 99214 PR OFFICE/OUTPT VISIT, EST, LEVL IV, 30-39 MIN: ICD-10-PCS | Mod: S$PBB,,, | Performed by: INTERNAL MEDICINE

## 2021-04-13 PROCEDURE — 99213 OFFICE O/P EST LOW 20 MIN: CPT | Mod: PBBFAC,PO | Performed by: INTERNAL MEDICINE

## 2021-04-13 PROCEDURE — 99999 PR PBB SHADOW E&M-EST. PATIENT-LVL III: ICD-10-PCS | Mod: PBBFAC,,, | Performed by: INTERNAL MEDICINE

## 2021-04-13 PROCEDURE — 99999 PR PBB SHADOW E&M-EST. PATIENT-LVL III: CPT | Mod: PBBFAC,,, | Performed by: INTERNAL MEDICINE

## 2021-04-13 RX ORDER — HYDROXYCHLOROQUINE SULFATE 200 MG/1
200 TABLET, FILM COATED ORAL 2 TIMES DAILY
Qty: 180 TABLET | Refills: 3 | Status: SHIPPED | OUTPATIENT
Start: 2021-04-13 | End: 2021-04-30

## 2021-04-29 ENCOUNTER — PATIENT MESSAGE (OUTPATIENT)
Dept: RESEARCH | Facility: HOSPITAL | Age: 66
End: 2021-04-29

## 2021-09-24 ENCOUNTER — PATIENT MESSAGE (OUTPATIENT)
Dept: RHEUMATOLOGY | Facility: CLINIC | Age: 66
End: 2021-09-24

## 2022-03-10 ENCOUNTER — PATIENT MESSAGE (OUTPATIENT)
Dept: RHEUMATOLOGY | Facility: CLINIC | Age: 67
End: 2022-03-10
Payer: MEDICARE

## 2022-05-05 ENCOUNTER — OFFICE VISIT (OUTPATIENT)
Dept: RHEUMATOLOGY | Facility: CLINIC | Age: 67
End: 2022-05-05
Payer: MEDICARE

## 2022-05-05 VITALS — WEIGHT: 177 LBS | BODY MASS INDEX: 33.42 KG/M2 | HEIGHT: 61 IN

## 2022-05-05 DIAGNOSIS — Z79.899 LONG-TERM USE OF PLAQUENIL: ICD-10-CM

## 2022-05-05 DIAGNOSIS — M35.9 UNDIFFERENTIATED CONNECTIVE TISSUE DISEASE: Primary | ICD-10-CM

## 2022-05-05 PROCEDURE — 99213 OFFICE O/P EST LOW 20 MIN: CPT | Mod: PBBFAC,PN | Performed by: INTERNAL MEDICINE

## 2022-05-05 PROCEDURE — 99999 PR PBB SHADOW E&M-EST. PATIENT-LVL III: CPT | Mod: PBBFAC,,, | Performed by: INTERNAL MEDICINE

## 2022-05-05 PROCEDURE — 99999 PR PBB SHADOW E&M-EST. PATIENT-LVL III: ICD-10-PCS | Mod: PBBFAC,,, | Performed by: INTERNAL MEDICINE

## 2022-05-05 PROCEDURE — 99215 PR OFFICE/OUTPT VISIT, EST, LEVL V, 40-54 MIN: ICD-10-PCS | Mod: S$PBB,,, | Performed by: INTERNAL MEDICINE

## 2022-05-05 PROCEDURE — 99215 OFFICE O/P EST HI 40 MIN: CPT | Mod: S$PBB,,, | Performed by: INTERNAL MEDICINE

## 2022-05-05 RX ORDER — ORAL SEMAGLUTIDE 7 MG/1
7 TABLET ORAL DAILY
COMMUNITY
Start: 2021-12-22

## 2022-05-05 ASSESSMENT — ROUTINE ASSESSMENT OF PATIENT INDEX DATA (RAPID3)
MDHAQ FUNCTION SCORE: 0
PSYCHOLOGICAL DISTRESS SCORE: 2.2
TOTAL RAPID3 SCORE: 0.33
FATIGUE SCORE: 1.1
PATIENT GLOBAL ASSESSMENT SCORE: 1
PAIN SCORE: 0

## 2022-05-05 NOTE — PROGRESS NOTES
Answers for HPI/ROS submitted by the patient on 2/16/2022  fever: No  eye redness: No  mouth sores: No  headaches: No  shortness of breath: No  chest pain: No  trouble swallowing: No  diarrhea: No  constipation: No  unexpected weight change: No  genital sore: No  dysuria: No  During the last 3 days, have you had a skin rash?: No  Bruises or bleeds easily: No  cough: No    Subjective:          Chief Complaint: Nesha Felix is a 67 y.o. female who had concerns including Disease Management.    HPI:    Patient is a 62y/o female here for f/u UCTD and Raynaud's.   She Describes hx of swelling in her hands with 15-30 minutes stiffness. She notes arthralgias and myalgias that is diffuse, she notes fatigue.   She notes Raynauds mild.    She did have serologies and repeat with direct ASHLEY +, SSA +, SSB negative, Smith negative and RNP negative.   \TPO negative/Thyroglobulin ab negative  Having itching for at least 3 years. Did not see Derm but Hydroxyzine with somnolence.   Recently did have repeat ASHLEY with IF 1:160 speckled with negative ASHLEY profile on HCQ.   NO SICCA symptoms. +malar type rash.     Working with Dr. Dickson s/p venous stenting.   OFF LYRICA,   Some pain at the right GTB and legs returning recently, mild. No edema. Will be seeing Anita Dickson,     She stopped HCQ for few months and noted increased joint stiffness. Better on HCQ. Restarted.   Due for eye exam:   She seems to have waxing and waning aches last weekend   Vimovo for arthritis using PRN which does help  Affected joints: feet stiff hard to walk.   She has some days with entire leg that is painful. Hx of sciatic did have PT no real help. Right mostly. DDD on older MRI of the L-spine follows with Dr. Escobar at [facility].   She     She has hx of Bladder cancer with no recurrence. More recently found to have renal mass. Will be seeing MD Velazco 10/1st and 2nd/ 2017- monitoring for 1 year.    Patient deneis any hx of seizure, stroke, DVT, pericarditis, pleurisy,  anemia, nephritis, leukopenia. Patient does not facial redness with slight photosensitivity (fatigue) no scarring rashes.   Dr. Bustamante-GI recent EUS with small lesion in stomach all benign    She is under significant stress,  with leukemia.   FmHx: sister with SLE (CL also my patient)    Component      Latest Ref Rng & Units 6/30/2017   Anti Sm Antibody      0.00 - 19.99 EU 0.44   Anti-Sm Interpretation      Negative Negative   Anti-SSA Antibody      0.00 - 19.99 EU 2.61   Anti-SSA Interpretation      Negative Negative   Anti-SSB Antibody      0.00 - 19.99 EU 0.41   Anti-SSB Interpretation      Negative Negative   ds DNA Ab      Negative 1:10 Negative 1:10   Anti Sm/RNP Antibody      0.00 - 19.99 EU 1.12   Anti-Sm/RNP Interpretation      Negative Negative   CRP      0.0 - 8.2 mg/L 7.5   ASHLEY HEP-2 Titer       Positive 1:160 Speckled       REVIEW OF SYSTEMS:    Review of Systems   Constitutional: Positive for malaise/fatigue. Negative for fever and weight loss.   HENT: Negative for sore throat.    Eyes: Negative for double vision, photophobia and redness.   Respiratory: Negative for cough, shortness of breath and wheezing.    Cardiovascular: Positive for leg swelling. Negative for chest pain, palpitations and orthopnea.   Gastrointestinal: Negative for abdominal pain, constipation and diarrhea.   Genitourinary: Negative for dysuria, hematuria and urgency.   Musculoskeletal: Positive for joint pain and myalgias. Negative for back pain.   Skin: Negative for rash.   Neurological: Negative for dizziness, tingling, focal weakness and headaches.   Endo/Heme/Allergies: Does not bruise/bleed easily.   Psychiatric/Behavioral: Negative for depression, hallucinations and suicidal ideas.               Objective:            Past Medical History:   Diagnosis Date    Acid reflux     Cancer 2012     bladder; surgery 4/2012 most recent check 4/2016 clear.    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    Rheumatoid arthritis     rheumatoid      Family History   Problem Relation Age of Onset    Cancer Mother     Heart disease Father     No Known Problems Sister     No Known Problems Brother     No Known Problems Daughter     No Known Problems Son     Cancer Maternal Aunt     Heart disease Maternal Aunt     Rheum arthritis Paternal Aunt     No Known Problems Sister     No Known Problems Sister     Osteoarthritis Sister     Lupus Sister      Social History     Tobacco Use    Smoking status: Never Smoker    Smokeless tobacco: Never Used   Substance Use Topics    Drug use: No         Current Outpatient Medications on File Prior to Visit   Medication Sig Dispense Refill    BIOTIN ORAL Take 10,000 mg by mouth.       bumetanide (BUMEX) 1 MG tablet Take 1 mg by mouth once daily.       clopidogreL (PLAVIX) 75 mg tablet Take 75 mg by mouth once daily.      hydrOXYchloroQUINE (PLAQUENIL) 200 mg tablet Take 1 tablet by mouth twice daily 180 tablet 0    metoprolol succinate (TOPROL-XL) 25 MG 24 hr tablet       rosuvastatin (CRESTOR) 20 MG tablet Take 20 mg by mouth every evening.       RYBELSUS 7 mg tablet Take 7 mg by mouth once daily.      spironolactone (ALDACTONE) 50 MG tablet TK 1 T PO D  3    TURMERIC ORAL Take 1,500 mg by mouth once daily.      aspirin (ECOTRIN) 81 MG EC tablet Take 81 mg by mouth once daily.      metFORMIN (GLUCOPHAGE) 850 MG tablet Take 850 mg by mouth 2 (two) times daily with meals.       Current Facility-Administered Medications on File Prior to Visit   Medication Dose Route Frequency Provider Last Rate Last Admin    EPINEPHrine (EPIPEN) 0.3 mg/0.3 mL pen injection 0.3 mg  0.3 mg Intramuscular PRN Xiomara Ma MD           There were no vitals filed for this visit.    Physical Exam:    Physical Exam  Constitutional:       Appearance: She is well-developed.   HENT:      Head: Normocephalic and atraumatic.   Eyes:      General: Lids are  normal.      Pupils: Pupils are equal, round, and reactive to light.   Cardiovascular:      Rate and Rhythm: Normal rate and regular rhythm.      Heart sounds: Normal heart sounds.   Pulmonary:      Effort: Pulmonary effort is normal.      Breath sounds: Normal breath sounds.   Musculoskeletal:      Right shoulder: No swelling or tenderness. Normal range of motion.      Left shoulder: No swelling or tenderness. Normal range of motion.      Right elbow: No swelling. Normal range of motion. No tenderness.      Left elbow: No swelling. Normal range of motion. No tenderness.      Right wrist: No swelling or tenderness. Normal range of motion.      Left wrist: No swelling or tenderness. Normal range of motion.      Right hand: No swelling or tenderness. Normal range of motion.      Left hand: No swelling or tenderness. Normal range of motion.      Cervical back: Normal range of motion.      Right knee: No swelling. Normal range of motion. No tenderness.      Left knee: No swelling. Normal range of motion. No tenderness.      Right foot: Normal range of motion. No swelling or tenderness.      Left foot: Normal range of motion. No swelling or tenderness.   Skin:     General: Skin is warm and dry.   Neurological:      Mental Status: She is alert and oriented to person, place, and time.   Psychiatric:         Behavior: Behavior normal.         Thought Content: Thought content normal.               Assessment:       Encounter Diagnoses   Name Primary?    Undifferentiated connective tissue disease Yes    Long-term use of Plaquenil           Plan:        Undifferentiated connective tissue disease  -     CBC Auto Differential; Standing; Expected date: 05/05/2022  -     Comprehensive Metabolic Panel; Standing; Expected date: 05/05/2022  -     Sedimentation rate; Standing; Expected date: 05/05/2022  -     C-Reactive Protein; Standing; Expected date: 05/05/2022    Long-term use of Plaquenil  -     CBC Auto Differential; Standing;  Expected date: 05/05/2022  -     Comprehensive Metabolic Panel; Standing; Expected date: 05/05/2022  -     Sedimentation rate; Standing; Expected date: 05/05/2022  -     C-Reactive Protein; Standing; Expected date: 05/05/2022        Most consistent with UCTD   -No significant SICCA to suggest Sjogrens, UCTD she has no evidence of synovitis on exam.   -HCQ 200mg BID.     -last eye exam:        Chronic leg pain: found to have bilateral common femoral narrowing with recent stenting Dr. Dickson. Some return of aches and pains, still better.     Renal mass followed in [place] and stable.     Discussed that I would recommend vaccination for SARS-CoV-2. She will consider.   No follow-ups on file.        40min consultation with greater than 50% spent in counseling, chart review and coordination of care. All questions answered.

## 2022-11-01 ENCOUNTER — PATIENT MESSAGE (OUTPATIENT)
Dept: RHEUMATOLOGY | Facility: CLINIC | Age: 67
End: 2022-11-01
Payer: MEDICARE

## 2022-11-07 ENCOUNTER — OFFICE VISIT (OUTPATIENT)
Dept: RHEUMATOLOGY | Facility: CLINIC | Age: 67
End: 2022-11-07
Payer: MEDICARE

## 2022-11-07 VITALS
BODY MASS INDEX: 31.01 KG/M2 | DIASTOLIC BLOOD PRESSURE: 69 MMHG | SYSTOLIC BLOOD PRESSURE: 115 MMHG | WEIGHT: 164.25 LBS | HEIGHT: 61 IN | HEART RATE: 65 BPM

## 2022-11-07 DIAGNOSIS — M06.9 RHEUMATOID ARTHRITIS, INVOLVING UNSPECIFIED SITE, UNSPECIFIED WHETHER RHEUMATOID FACTOR PRESENT: ICD-10-CM

## 2022-11-07 DIAGNOSIS — M35.9 UNDIFFERENTIATED CONNECTIVE TISSUE DISEASE: Primary | ICD-10-CM

## 2022-11-07 PROCEDURE — 99999 PR PBB SHADOW E&M-EST. PATIENT-LVL III: ICD-10-PCS | Mod: PBBFAC,,, | Performed by: INTERNAL MEDICINE

## 2022-11-07 PROCEDURE — 99214 OFFICE O/P EST MOD 30 MIN: CPT | Mod: S$PBB,,, | Performed by: INTERNAL MEDICINE

## 2022-11-07 PROCEDURE — 99213 OFFICE O/P EST LOW 20 MIN: CPT | Mod: PBBFAC,PN | Performed by: INTERNAL MEDICINE

## 2022-11-07 PROCEDURE — 99999 PR PBB SHADOW E&M-EST. PATIENT-LVL III: CPT | Mod: PBBFAC,,, | Performed by: INTERNAL MEDICINE

## 2022-11-07 PROCEDURE — 99214 PR OFFICE/OUTPT VISIT, EST, LEVL IV, 30-39 MIN: ICD-10-PCS | Mod: S$PBB,,, | Performed by: INTERNAL MEDICINE

## 2022-11-07 RX ORDER — TRAZODONE HYDROCHLORIDE 50 MG/1
50 TABLET ORAL NIGHTLY
COMMUNITY
Start: 2022-10-17

## 2022-11-07 NOTE — PROGRESS NOTES
Subjective:          Chief Complaint: Nesha Felix is a 67 y.o. female who had concerns including Disease Management.    HPI:    Patient is a 68y/o female here for f/u UCTD and Raynaud's.   She Describes hx of swelling in her hands with 15-30 minutes stiffness. She notes arthralgias and myalgias that is diffuse, she notes fatigue.   She notes Raynauds mild.    She did have serologies and repeat with direct ASHLEY +, SSA +, SSB negative, Smith negative and RNP negative.   \TPO negative/Thyroglobulin ab negative  Having itching for at least 3 years. Did not see Derm but Hydroxyzine with somnolence.   Recently did have repeat ASHLEY with IF 1:160 speckled with negative ASHLEY profile on HCQ.   NO SICCA symptoms. +malar type rash.     Working with Dr. Dickson s/p venous stenting.   OFF LYRICA,   Some pain at the right GTB and legs returning recently, mild. No edema. Will be seeing Anita Dickson,     She stopped HCQ for few months and noted increased joint stiffness. Better on HCQ. Restarted.   Due for eye exam:   She seems to have waxing and waning aches last weekend   Vimovo for arthritis using PRN which does help  Affected joints: feet stiff hard to walk.   She has some days with entire leg that is painful. Hx of sciatic did have PT no real help. Right mostly. DDD on older MRI of the L-spine follows with Dr. Escobar at [facility].   She     She has hx of Bladder cancer with no recurrence. More recently found to have renal mass. Will be seeing MD Velazco 10/1st and 2nd/ 2017- monitoring for 1 year.    Patient deneis any hx of seizure, stroke, DVT, pericarditis, pleurisy, anemia, nephritis, leukopenia. Patient does not facial redness with slight photosensitivity (fatigue) no scarring rashes.   Dr. Bustamante-GI recent EUS with small lesion in stomach all benign    She is under significant stress,  with leukemia.   FmHx: sister with SLE (CL also my patient)    Component      Latest Ref Rng & Units 6/30/2017   Anti Sm Antibody       0.00 - 19.99 EU 0.44   Anti-Sm Interpretation      Negative Negative   Anti-SSA Antibody      0.00 - 19.99 EU 2.61   Anti-SSA Interpretation      Negative Negative   Anti-SSB Antibody      0.00 - 19.99 EU 0.41   Anti-SSB Interpretation      Negative Negative   ds DNA Ab      Negative 1:10 Negative 1:10   Anti Sm/RNP Antibody      0.00 - 19.99 EU 1.12   Anti-Sm/RNP Interpretation      Negative Negative   CRP      0.0 - 8.2 mg/L 7.5   ASHLEY HEP-2 Titer       Positive 1:160 Speckled       REVIEW OF SYSTEMS:    Review of Systems   Constitutional:  Positive for malaise/fatigue. Negative for fever and weight loss.   HENT:  Negative for sore throat.    Eyes:  Negative for double vision, photophobia and redness.   Respiratory:  Negative for cough, shortness of breath and wheezing.    Cardiovascular:  Positive for leg swelling. Negative for chest pain, palpitations and orthopnea.   Gastrointestinal:  Negative for abdominal pain, constipation and diarrhea.   Genitourinary:  Negative for dysuria, hematuria and urgency.   Musculoskeletal:  Positive for joint pain and myalgias. Negative for back pain.   Skin:  Negative for rash.   Neurological:  Negative for dizziness, tingling, focal weakness and headaches.   Endo/Heme/Allergies:  Does not bruise/bleed easily.   Psychiatric/Behavioral:  Negative for depression, hallucinations and suicidal ideas.              Objective:            Past Medical History:   Diagnosis Date    Acid reflux     Cancer 2012     bladder; surgery 4/2012 most recent check 4/2016 clear.     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    Rheumatoid arthritis     rheumatoid      Family History   Problem Relation Age of Onset    Cancer Mother     Heart disease Father     No Known Problems Sister     No Known Problems Brother     No Known Problems Daughter     No Known Problems Son     Cancer Maternal Aunt     Heart disease Maternal Aunt     Rheum arthritis Paternal  Aunt     No Known Problems Sister     No Known Problems Sister     Osteoarthritis Sister     Lupus Sister      Social History     Tobacco Use    Smoking status: Never    Smokeless tobacco: Never   Substance Use Topics    Drug use: No         Current Outpatient Medications on File Prior to Visit   Medication Sig Dispense Refill    aspirin (ECOTRIN) 81 MG EC tablet Take 81 mg by mouth once daily.      BIOTIN ORAL Take 10,000 mg by mouth.       bumetanide (BUMEX) 1 MG tablet Take 1 mg by mouth once daily.       clopidogreL (PLAVIX) 75 mg tablet Take 75 mg by mouth once daily.      hydrOXYchloroQUINE (PLAQUENIL) 200 mg tablet Take 1 tablet by mouth twice daily 180 tablet 3    metFORMIN (GLUCOPHAGE) 850 MG tablet Take 850 mg by mouth 2 (two) times daily with meals.      metoprolol succinate (TOPROL-XL) 25 MG 24 hr tablet       rosuvastatin (CRESTOR) 20 MG tablet Take 20 mg by mouth every evening.       RYBELSUS 7 mg tablet Take 7 mg by mouth once daily.      spironolactone (ALDACTONE) 50 MG tablet TK 1 T PO D  3    traZODone (DESYREL) 50 MG tablet Take 50 mg by mouth every evening.      TURMERIC ORAL Take 1,500 mg by mouth once daily.       Current Facility-Administered Medications on File Prior to Visit   Medication Dose Route Frequency Provider Last Rate Last Admin    EPINEPHrine (EPIPEN) 0.3 mg/0.3 mL pen injection 0.3 mg  0.3 mg Intramuscular PRN Xiomara Ma MD           Vitals:    11/07/22 1257   BP: 115/69   Pulse: 65       Physical Exam:    Physical Exam  Constitutional:       Appearance: She is well-developed.   HENT:      Head: Normocephalic and atraumatic.   Eyes:      General: Lids are normal.      Pupils: Pupils are equal, round, and reactive to light.   Cardiovascular:      Rate and Rhythm: Normal rate and regular rhythm.      Heart sounds: Normal heart sounds.   Pulmonary:      Effort: Pulmonary effort is normal.      Breath sounds: Normal breath sounds.   Musculoskeletal:      Right shoulder: No  swelling or tenderness. Normal range of motion.      Left shoulder: No swelling or tenderness. Normal range of motion.      Right elbow: No swelling. Normal range of motion. No tenderness.      Left elbow: No swelling. Normal range of motion. No tenderness.      Right wrist: No swelling or tenderness. Normal range of motion.      Left wrist: No swelling or tenderness. Normal range of motion.      Right hand: No swelling or tenderness. Normal range of motion.      Left hand: No swelling or tenderness. Normal range of motion.      Cervical back: Normal range of motion.      Right knee: No swelling. Normal range of motion. No tenderness.      Left knee: No swelling. Normal range of motion. No tenderness.      Right foot: Normal range of motion. No swelling or tenderness.      Left foot: Normal range of motion. No swelling or tenderness.   Skin:     General: Skin is warm and dry.   Neurological:      Mental Status: She is alert and oriented to person, place, and time.   Psychiatric:         Behavior: Behavior normal.         Thought Content: Thought content normal.             Assessment:       Encounter Diagnosis   Name Primary?    Rheumatoid arthritis, involving unspecified site, unspecified whether rheumatoid factor present             Plan:        Undifferentiated connective tissue disease    Rheumatoid arthritis, involving unspecified site, unspecified whether rheumatoid factor present    Most consistent with UCTD   -No significant SICCA to suggest Sjogrens, UCTD she has no evidence of synovitis on exam.   -HCQ 200mg BID.  I see some changes in her skin around forehead and given weight loss I am going to decrease to HCQ 200mg ONCE daily.    -last eye exam:      Chronic leg pain: found to have bilateral common femoral narrowing with recent stenting Dr. Dickson. Some return of aches and pains, still better.     Renal mass followed in [place] and stable.     Discussed that I would recommend vaccination for SARS-CoV-2.  She will consider.     Follow up in about 6 months (around 5/7/2023).        30min consultation with greater than 50% spent in counseling, chart review and coordination of care. All questions answered.

## 2023-05-15 ENCOUNTER — TELEPHONE (OUTPATIENT)
Dept: OPTOMETRY | Facility: CLINIC | Age: 68
End: 2023-05-15
Payer: MEDICARE

## 2023-05-15 NOTE — TELEPHONE ENCOUNTER
----- Message from Latoya Kent sent at 5/15/2023  2:24 PM CDT -----  Contact: pt [phone]  Type:  Sooner Appointment Request    Caller is requesting a sooner appointment.  Caller declined first available appointment listed below.  Caller will not accept being placed on the waitlist and is requesting a message be sent to doctor.    Name of Caller:  Pt   When is the first available appointment?  June 8  Symptoms:  Pain in right eye/ feels like something is in her eye   Best Call Back Number:  [phone]    Additional Information:  Pls call back and advise

## 2023-05-16 ENCOUNTER — TELEPHONE (OUTPATIENT)
Dept: OPHTHALMOLOGY | Facility: CLINIC | Age: 68
End: 2023-05-16
Payer: MEDICARE

## 2023-05-16 NOTE — TELEPHONE ENCOUNTER
Spoke with pt. And scheduled appt.     ----- Message from Ria Hartley sent at 5/16/2023  1:19 PM CDT -----  Regarding: patient returning call  Type:  Patient Returning Call    Who Called: ISABELLA HASTINGS [[id number]]    Who Left Message for Patient: Jennifer     Does the patient know what this is regarding? YES    Best Call Back Number: [phone]     Additional Information: Patient is requesting a call back to schedule an appointment. Please advise!

## 2023-05-17 ENCOUNTER — OFFICE VISIT (OUTPATIENT)
Dept: OPTOMETRY | Facility: CLINIC | Age: 68
End: 2023-05-17
Payer: MEDICARE

## 2023-05-17 DIAGNOSIS — H04.123 BILATERAL DRY EYES: Primary | ICD-10-CM

## 2023-05-17 PROCEDURE — 99203 OFFICE O/P NEW LOW 30 MIN: CPT | Mod: S$PBB,,, | Performed by: OPTOMETRIST

## 2023-05-17 PROCEDURE — 99999 PR PBB SHADOW E&M-EST. PATIENT-LVL III: CPT | Mod: PBBFAC,,, | Performed by: OPTOMETRIST

## 2023-05-17 PROCEDURE — 99213 OFFICE O/P EST LOW 20 MIN: CPT | Mod: PBBFAC,PO | Performed by: OPTOMETRIST

## 2023-05-17 PROCEDURE — 99203 PR OFFICE/OUTPT VISIT, NEW, LEVL III, 30-44 MIN: ICD-10-PCS | Mod: S$PBB,,, | Performed by: OPTOMETRIST

## 2023-05-17 PROCEDURE — 99999 PR PBB SHADOW E&M-EST. PATIENT-LVL III: ICD-10-PCS | Mod: PBBFAC,,, | Performed by: OPTOMETRIST

## 2023-05-17 NOTE — PROGRESS NOTES
HPI    Pt here for urgent exam for eye pain OD. Pt sts last wk cut grass and   thinks some grass got into eye, OD started hurting on Saturday with a   sharp pain when moving eye, pain scale 2-3. No gtts.  Last edited by Brisa Galeana MA on 5/17/2023  1:05 PM.            Assessment /Plan     For exam results, see Encounter Report.    Bilateral dry eyes      Recommend artificial tears. 1 drop 4x per day. Chronicity of disease and treatment discussed. RTc or call if no better

## 2023-08-17 ENCOUNTER — OFFICE VISIT (OUTPATIENT)
Dept: OPTOMETRY | Facility: CLINIC | Age: 68
End: 2023-08-17
Payer: MEDICARE

## 2023-08-17 DIAGNOSIS — Z96.1 PSEUDOPHAKIA OF BOTH EYES: ICD-10-CM

## 2023-08-17 DIAGNOSIS — Z79.899 LONG-TERM USE OF PLAQUENIL: ICD-10-CM

## 2023-08-17 DIAGNOSIS — H04.123 BILATERAL DRY EYES: ICD-10-CM

## 2023-08-17 DIAGNOSIS — M06.9 RHEUMATOID ARTHRITIS, INVOLVING UNSPECIFIED SITE, UNSPECIFIED WHETHER RHEUMATOID FACTOR PRESENT: Primary | ICD-10-CM

## 2023-08-17 PROCEDURE — 92134 CPTRZ OPH DX IMG PST SGM RTA: CPT | Mod: PBBFAC,PO | Performed by: OPTOMETRIST

## 2023-08-17 PROCEDURE — 99999 PR PBB SHADOW E&M-EST. PATIENT-LVL III: CPT | Mod: PBBFAC,,, | Performed by: OPTOMETRIST

## 2023-08-17 PROCEDURE — 99999 PR PBB SHADOW E&M-EST. PATIENT-LVL III: ICD-10-PCS | Mod: PBBFAC,,, | Performed by: OPTOMETRIST

## 2023-08-17 PROCEDURE — 92014 PR EYE EXAM, EST PATIENT,COMPREHESV: ICD-10-PCS | Mod: S$PBB,,, | Performed by: OPTOMETRIST

## 2023-08-17 PROCEDURE — 99213 OFFICE O/P EST LOW 20 MIN: CPT | Mod: PBBFAC,PO | Performed by: OPTOMETRIST

## 2023-08-17 PROCEDURE — 92134 POSTERIOR SEGMENT OCT RETINA (OCULAR COHERENCE TOMOGRAPHY)-BOTH EYES: ICD-10-PCS | Mod: 26,S$PBB,, | Performed by: OPTOMETRIST

## 2023-08-17 PROCEDURE — 92014 COMPRE OPH EXAM EST PT 1/>: CPT | Mod: S$PBB,,, | Performed by: OPTOMETRIST

## 2023-08-17 NOTE — PROGRESS NOTES
HPI    Routine/plaq check-dle-5/23    Denies any blurred va. Wearing readers. Denies any flashes, some floaters.   Using AT BID. Taking plaquenil x 7 years. Rescheduled HVF for October.    Last edited by Terri Cintron on 8/17/2023  2:47 PM.            Assessment /Plan     For exam results, see Encounter Report.    Rheumatoid arthritis, involving unspecified site, unspecified whether rheumatoid factor present    Long-term use of Plaquenil    Bilateral dry eyes    Pseudophakia of both eyes      1,2. No plaq maculopathy, rtc for hvf(10-2), oct mac normal ou, call with resulkts  3. Recommend artificial tears. 1 drop 2x per day. Chronicity of disease and treatment discussed.   4. Monitor condition. Patient to report any changes. RTC 1 year recheck.            Addend 10/25/23 normal 10-2 vf ou

## 2023-09-10 ENCOUNTER — OFFICE VISIT (OUTPATIENT)
Dept: URGENT CARE | Facility: CLINIC | Age: 68
End: 2023-09-10
Payer: MEDICARE

## 2023-09-10 VITALS
HEIGHT: 61 IN | OXYGEN SATURATION: 97 % | RESPIRATION RATE: 16 BRPM | DIASTOLIC BLOOD PRESSURE: 71 MMHG | BODY MASS INDEX: 31.15 KG/M2 | TEMPERATURE: 99 F | SYSTOLIC BLOOD PRESSURE: 125 MMHG | HEART RATE: 91 BPM | WEIGHT: 165 LBS

## 2023-09-10 DIAGNOSIS — L03.90 CELLULITIS, UNSPECIFIED CELLULITIS SITE: Primary | ICD-10-CM

## 2023-09-10 PROCEDURE — 99213 OFFICE O/P EST LOW 20 MIN: CPT | Mod: S$GLB,,, | Performed by: NURSE PRACTITIONER

## 2023-09-10 PROCEDURE — 99213 PR OFFICE/OUTPT VISIT, EST, LEVL III, 20-29 MIN: ICD-10-PCS | Mod: S$GLB,,, | Performed by: NURSE PRACTITIONER

## 2023-09-10 RX ORDER — SULFAMETHOXAZOLE AND TRIMETHOPRIM 800; 160 MG/1; MG/1
1 TABLET ORAL 2 TIMES DAILY
Qty: 14 TABLET | Refills: 0 | Status: SHIPPED | OUTPATIENT
Start: 2023-09-10

## 2023-09-10 RX ORDER — PREDNISONE 10 MG/1
TABLET ORAL
Qty: 20 TABLET | Refills: 0 | Status: SHIPPED | OUTPATIENT
Start: 2023-09-10

## 2023-09-10 NOTE — PATIENT INSTRUCTIONS

## 2023-09-10 NOTE — PROGRESS NOTES
"Subjective:      Patient ID: Nesha Felix is a 68 y.o. female.    Vitals:  height is 5' 1" (1.549 m) and weight is 74.8 kg (165 lb). Her tympanic temperature is 99.2 °F (37.3 °C). Her blood pressure is 125/71 and her pulse is 91. Her respiration is 16 and oxygen saturation is 97%.     Chief Complaint: Insect Bite    Pt complaining of itching and swelling from a yellow jacket sting yesterday afternoon.  Pt states it is swollen more throughout the day today.  Pt states she put benadryl ointment on it yesterday and this morning.     Insect Bite  This is a new problem. The current episode started yesterday. The problem has been gradually worsening. Pertinent negatives include no abdominal pain, anorexia, arthralgias, change in bowel habit, chest pain, chills, congestion, coughing, diaphoresis, fatigue, fever, headaches, joint swelling, myalgias, nausea, neck pain, numbness, rash, sore throat, swollen glands, urinary symptoms, vertigo, visual change, vomiting or weakness.       Constitution: Negative for chills, sweating, fatigue and fever.   HENT:  Negative for congestion and sore throat.    Neck: Negative for neck pain.   Cardiovascular:  Negative for chest pain.   Respiratory:  Negative for cough.    Gastrointestinal:  Negative for abdominal pain, nausea and vomiting.   Musculoskeletal:  Negative for joint pain, joint swelling and muscle ache.   Skin:  Positive for erythema. Negative for rash.   Neurological:  Negative for history of vertigo, headaches and numbness.      Objective:     Physical Exam   Constitutional: She is oriented to person, place, and time. She appears well-developed. She is cooperative.  Non-toxic appearance. She does not appear ill. No distress.   HENT:   Head: Normocephalic and atraumatic.   Ears:   Right Ear: Hearing, tympanic membrane, external ear and ear canal normal.   Left Ear: Hearing, tympanic membrane, external ear and ear canal normal.   Nose: Nose normal. No mucosal edema, " rhinorrhea or nasal deformity. No epistaxis. Right sinus exhibits no maxillary sinus tenderness and no frontal sinus tenderness. Left sinus exhibits no maxillary sinus tenderness and no frontal sinus tenderness.   Mouth/Throat: Uvula is midline, oropharynx is clear and moist and mucous membranes are normal. No trismus in the jaw. Normal dentition. No uvula swelling. No oropharyngeal exudate, posterior oropharyngeal edema or posterior oropharyngeal erythema.   Eyes: Conjunctivae and lids are normal. No scleral icterus.   Neck: Trachea normal and phonation normal. Neck supple. No edema present. No erythema present. No neck rigidity present.   Cardiovascular: Normal rate, regular rhythm, normal heart sounds and normal pulses.   Pulmonary/Chest: Effort normal and breath sounds normal. No respiratory distress. She has no decreased breath sounds. She has no rhonchi.   Abdominal: Normal appearance.   Musculoskeletal: Normal range of motion.         General: No deformity. Normal range of motion.   Neurological: She is alert and oriented to person, place, and time. She exhibits normal muscle tone. Coordination normal.   Skin: Skin is warm, dry, intact, not diaphoretic and not pale. erythema   Psychiatric: Her speech is normal and behavior is normal. Judgment and thought content normal.   Nursing note and vitals reviewed.    Erythema to left inner thigh due to wasp bite    Assessment:     1. Cellulitis, unspecified cellulitis site        Plan:       Cellulitis, unspecified cellulitis site  -     predniSONE (DELTASONE) 10 MG tablet; Take 40mg x2 days, 30 mg x2 days, 20mg x2 days, 10mg x2 days.  Dispense: 20 tablet; Refill: 0  -     sulfamethoxazole-trimethoprim 800-160mg (BACTRIM DS) 800-160 mg Tab; Take 1 tablet by mouth 2 (two) times daily.  Dispense: 14 tablet; Refill: 0      Patient Instructions   INSTRUCTIONS:  - Rest.  - Drink plenty of fluids.  - Take Tylenol and/or Ibuprofen as directed as needed for fever/pain.  Do  not take more than the recommended dose.  - follow up with your PCP within the next 1-2 weeks as needed.  - You must understand that you have received an Urgent Care treatment only and that you may be released before all of your medical problems are known or treated.   - You, the patient, will arrange for follow up care as instructed.   - If your condition worsens or fails to improve we recommend that you receive another evaluation at the ER immediately or contact your PCP to discuss your concerns.   - You can call [phone] or [phone] to help schedule an appointment with the appropriate provider.     -If you smoke cigarettes, it would be beneficial for you to stop.

## 2023-10-24 ENCOUNTER — OFFICE VISIT (OUTPATIENT)
Dept: RHEUMATOLOGY | Facility: CLINIC | Age: 68
End: 2023-10-24
Payer: MEDICARE

## 2023-10-24 ENCOUNTER — CLINICAL SUPPORT (OUTPATIENT)
Dept: OPHTHALMOLOGY | Facility: CLINIC | Age: 68
End: 2023-10-24
Payer: MEDICARE

## 2023-10-24 VITALS
DIASTOLIC BLOOD PRESSURE: 68 MMHG | HEART RATE: 62 BPM | BODY MASS INDEX: 32.32 KG/M2 | HEIGHT: 61 IN | SYSTOLIC BLOOD PRESSURE: 112 MMHG | WEIGHT: 171.19 LBS

## 2023-10-24 DIAGNOSIS — M35.9 UNDIFFERENTIATED CONNECTIVE TISSUE DISEASE: Primary | ICD-10-CM

## 2023-10-24 DIAGNOSIS — Z79.899 LONG-TERM USE OF PLAQUENIL: ICD-10-CM

## 2023-10-24 DIAGNOSIS — M79.10 MYALGIA: ICD-10-CM

## 2023-10-24 DIAGNOSIS — M06.9 RHEUMATOID ARTHRITIS, INVOLVING UNSPECIFIED SITE, UNSPECIFIED WHETHER RHEUMATOID FACTOR PRESENT: ICD-10-CM

## 2023-10-24 PROCEDURE — 99213 OFFICE O/P EST LOW 20 MIN: CPT | Mod: PBBFAC,PN | Performed by: INTERNAL MEDICINE

## 2023-10-24 PROCEDURE — 99214 PR OFFICE/OUTPT VISIT, EST, LEVL IV, 30-39 MIN: ICD-10-PCS | Mod: S$PBB,,, | Performed by: INTERNAL MEDICINE

## 2023-10-24 PROCEDURE — 99214 OFFICE O/P EST MOD 30 MIN: CPT | Mod: S$PBB,,, | Performed by: INTERNAL MEDICINE

## 2023-10-24 PROCEDURE — 99999 PR PBB SHADOW E&M-EST. PATIENT-LVL III: CPT | Mod: PBBFAC,,, | Performed by: INTERNAL MEDICINE

## 2023-10-24 PROCEDURE — 99999 PR PBB SHADOW E&M-EST. PATIENT-LVL III: ICD-10-PCS | Mod: PBBFAC,,, | Performed by: INTERNAL MEDICINE

## 2023-10-24 NOTE — PROGRESS NOTES
Subjective:          Chief Complaint: Nesha Felix is a 68 y.o. female who had concerns including Disease Management.    HPI:    Patient is a 66y/o female here for f/u UCTD and Raynaud's.   She Describes hx of swelling in her hands with 15-30 minutes stiffness. She notes arthralgias and myalgias that is diffuse, she notes fatigue.   She notes Raynauds mild.    She did have serologies and repeat with direct ASHLEY +, SSA +, SSB negative, Smith negative and RNP negative.   \TPO negative/Thyroglobulin ab negative  Having itching for at least 3 years. Did not see Derm but Hydroxyzine with somnolence.   Recently did have repeat ASHLEY with IF 1:160 speckled with negative ASHLEY profile on HCQ.   NO SICCA symptoms. +malar type rash.     Working with Dr. Dickson s/p venous stenting.   OFF LYRICA,   Some pain at the right GTB and legs returning recently, mild. No edema. Will be seeing Anita Dickson,     She stopped HCQ for few months and noted increased joint stiffness. Better on HCQ. Restarted.   Due for eye exam:   She seems to have waxing and waning aches last weekend   Vimovo for arthritis using PRN which does help  Affected joints: feet stiff hard to walk.   She has some days with entire leg that is painful. Hx of sciatic did have PT no real help. Right mostly. DDD on older MRI of the L-spine follows with Dr. Escobar at [facility].   She     She has hx of Bladder cancer with no recurrence. More recently found to have renal mass. Will be seeing MD Velazco 10/1st and 2nd/ 2017- monitoring for 1 year.    Patient deneis any hx of seizure, stroke, DVT, pericarditis, pleurisy, anemia, nephritis, leukopenia. Patient does not facial redness with slight photosensitivity (fatigue) no scarring rashes.   Dr. Bustamante-GI recent EUS with small lesion in stomach all benign    She is under significant stress,  with leukemia.   FmHx: sister with SLE (CL also my patient)    Component      Latest Ref Rng & Units 6/30/2017   Anti Sm Antibody       0.00 - 19.99 EU 0.44   Anti-Sm Interpretation      Negative Negative   Anti-SSA Antibody      0.00 - 19.99 EU 2.61   Anti-SSA Interpretation      Negative Negative   Anti-SSB Antibody      0.00 - 19.99 EU 0.41   Anti-SSB Interpretation      Negative Negative   ds DNA Ab      Negative 1:10 Negative 1:10   Anti Sm/RNP Antibody      0.00 - 19.99 EU 1.12   Anti-Sm/RNP Interpretation      Negative Negative   CRP      0.0 - 8.2 mg/L 7.5   ASHLEY HEP-2 Titer       Positive 1:160 Speckled       REVIEW OF SYSTEMS:    Review of Systems   Constitutional:  Positive for malaise/fatigue. Negative for fever and weight loss.   HENT:  Negative for sore throat.    Eyes:  Negative for double vision, photophobia and redness.   Respiratory:  Negative for cough, shortness of breath and wheezing.    Cardiovascular:  Positive for leg swelling. Negative for chest pain, palpitations and orthopnea.   Gastrointestinal:  Negative for abdominal pain, constipation and diarrhea.   Genitourinary:  Negative for dysuria, hematuria and urgency.   Musculoskeletal:  Positive for joint pain and myalgias. Negative for back pain.   Skin:  Negative for rash.   Neurological:  Negative for dizziness, tingling, focal weakness and headaches.   Endo/Heme/Allergies:  Does not bruise/bleed easily.   Psychiatric/Behavioral:  Negative for depression, hallucinations and suicidal ideas.                Objective:            Past Medical History:   Diagnosis Date    Acid reflux     Cancer 2012     bladder; surgery 4/2012 most recent check 4/2016 clear.     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    Rheumatoid arthritis     rheumatoid     Thyroid disease      Family History   Problem Relation Age of Onset    Cancer Mother     Heart disease Father     No Known Problems Sister     No Known Problems Sister     No Known Problems Sister     Osteoarthritis Sister     Lupus Sister     No Known Problems Brother     Cancer Maternal  Aunt     Heart disease Maternal Aunt     Rheum arthritis Paternal Aunt     No Known Problems Daughter     No Known Problems Son     Glaucoma Neg Hx      Social History     Tobacco Use    Smoking status: Never    Smokeless tobacco: Never   Substance Use Topics    Drug use: No         Current Outpatient Medications on File Prior to Visit   Medication Sig Dispense Refill    aspirin (ECOTRIN) 81 MG EC tablet Take 81 mg by mouth once daily.      bumetanide (BUMEX) 1 MG tablet Take 1 mg by mouth once daily.       clopidogreL (PLAVIX) 75 mg tablet Take 75 mg by mouth once daily.      hydrOXYchloroQUINE (PLAQUENIL) 200 mg tablet Take 1 tablet by mouth twice daily (Patient taking differently: Take 200 mg by mouth once daily.) 180 tablet 3    levothyroxine (SYNTHROID) 25 MCG tablet Take 50 mcg by mouth before breakfast.      metoprolol succinate (TOPROL-XL) 25 MG 24 hr tablet       pantoprazole (PROTONIX) 40 MG tablet Take 40 mg by mouth once daily.      predniSONE (DELTASONE) 10 MG tablet Take 40mg x2 days, 30 mg x2 days, 20mg x2 days, 10mg x2 days. 20 tablet 0    rosuvastatin (CRESTOR) 20 MG tablet Take 20 mg by mouth every evening.       RYBELSUS 7 mg tablet Take 7 mg by mouth once daily.      spironolactone (ALDACTONE) 50 MG tablet TK 1 T PO D  3    sulfamethoxazole-trimethoprim 800-160mg (BACTRIM DS) 800-160 mg Tab Take 1 tablet by mouth 2 (two) times daily. 14 tablet 0    traZODone (DESYREL) 50 MG tablet Take 50 mg by mouth every evening.      metFORMIN (GLUCOPHAGE) 850 MG tablet Take 850 mg by mouth 2 (two) times daily with meals.      TURMERIC ORAL Take 1,500 mg by mouth once daily.       Current Facility-Administered Medications on File Prior to Visit   Medication Dose Route Frequency Provider Last Rate Last Admin    EPINEPHrine (EPIPEN) 0.3 mg/0.3 mL pen injection 0.3 mg  0.3 mg Intramuscular PRN Xiomara Ma MD           Vitals:    10/24/23 1551   BP: 112/68   Pulse: 62       Physical Exam:    Physical  Exam  Constitutional:       Appearance: She is well-developed.   HENT:      Head: Normocephalic and atraumatic.   Eyes:      General: Lids are normal.      Pupils: Pupils are equal, round, and reactive to light.   Cardiovascular:      Rate and Rhythm: Normal rate and regular rhythm.      Heart sounds: Normal heart sounds.   Pulmonary:      Effort: Pulmonary effort is normal.      Breath sounds: Normal breath sounds.   Musculoskeletal:      Right shoulder: No swelling or tenderness. Normal range of motion.      Left shoulder: No swelling or tenderness. Normal range of motion.      Right elbow: No swelling. Normal range of motion. No tenderness.      Left elbow: No swelling. Normal range of motion. No tenderness.      Right wrist: No swelling or tenderness. Normal range of motion.      Left wrist: No swelling or tenderness. Normal range of motion.      Right hand: No swelling or tenderness. Normal range of motion.      Left hand: No swelling or tenderness. Normal range of motion.      Cervical back: Normal range of motion.      Right knee: No swelling. Normal range of motion. No tenderness.      Left knee: No swelling. Normal range of motion. No tenderness.      Right foot: Normal range of motion. No swelling or tenderness.      Left foot: Normal range of motion. No swelling or tenderness.   Skin:     General: Skin is warm and dry.   Neurological:      Mental Status: She is alert and oriented to person, place, and time.   Psychiatric:         Behavior: Behavior normal.         Thought Content: Thought content normal.               Assessment:       Encounter Diagnosis   Name Primary?    Undifferentiated connective tissue disease Yes              Plan:        Undifferentiated connective tissue disease  -     hydroxychloroquine (PLAQUENIL) 200 mg tablet; Take 1 tablet (200 mg total) by mouth 2 (two) times daily.  Dispense: 180 tablet; Refill: 3      Most consistent with UCTD   -No significant SICCA to suggest Sjogrens,  UCTD she has no evidence of synovitis on exam.   -HCQ 200mg BID.  I see some changes in her skin around forehead and given weight loss I am going to decrease to HCQ 200mg ONCE daily.    -last eye exam:      Chronic leg pain: found to have bilateral common femoral narrowing with recent stenting Dr. Dickson. Some return of aches and pains, still better. Will be 5 years since stenting having update scans soon.     Renal mass followed in [place] and stable.       Follow up in about 6 months (around 4/24/2024).           30min consultation with greater than 50% of that time included Preparing to see the patient (review records, tests), Obtaining and/or reviewing separately obtained historical data, Performing a medically appropriate examination and/or evaluation , Ordering medications, tests, and/or procedures, Referring and communicating with other healthcare professionals , Documenting clinical information in the electronic or other health record and Independently interpreting results  (as warranted) & communicating results to the patient/family/caregiver. All questions answered.

## 2023-10-25 ENCOUNTER — PATIENT MESSAGE (OUTPATIENT)
Dept: OPTOMETRY | Facility: CLINIC | Age: 68
End: 2023-10-25
Payer: MEDICARE

## 2023-10-25 RX ORDER — HYDROXYCHLOROQUINE SULFATE 200 MG/1
200 TABLET, FILM COATED ORAL 2 TIMES DAILY
Qty: 180 TABLET | Refills: 3 | Status: SHIPPED | OUTPATIENT
Start: 2023-10-25

## 2024-04-16 ENCOUNTER — PATIENT MESSAGE (OUTPATIENT)
Dept: RHEUMATOLOGY | Facility: CLINIC | Age: 69
End: 2024-04-16
Payer: MEDICARE

## 2024-05-02 ENCOUNTER — OFFICE VISIT (OUTPATIENT)
Dept: RHEUMATOLOGY | Facility: CLINIC | Age: 69
End: 2024-05-02
Payer: MEDICARE

## 2024-05-02 VITALS
DIASTOLIC BLOOD PRESSURE: 71 MMHG | HEIGHT: 61 IN | SYSTOLIC BLOOD PRESSURE: 114 MMHG | BODY MASS INDEX: 33.05 KG/M2 | WEIGHT: 175.06 LBS | HEART RATE: 71 BPM

## 2024-05-02 DIAGNOSIS — Z79.899 HIGH RISK MEDICATIONS (NOT ANTICOAGULANTS) LONG-TERM USE: ICD-10-CM

## 2024-05-02 DIAGNOSIS — M35.9 UNDIFFERENTIATED CONNECTIVE TISSUE DISEASE: Primary | ICD-10-CM

## 2024-05-02 PROCEDURE — 99214 OFFICE O/P EST MOD 30 MIN: CPT | Mod: PBBFAC,PN | Performed by: INTERNAL MEDICINE

## 2024-05-02 PROCEDURE — 99214 OFFICE O/P EST MOD 30 MIN: CPT | Mod: S$PBB,,, | Performed by: INTERNAL MEDICINE

## 2024-05-02 PROCEDURE — 99999 PR PBB SHADOW E&M-EST. PATIENT-LVL IV: CPT | Mod: PBBFAC,,, | Performed by: INTERNAL MEDICINE

## 2024-05-02 RX ORDER — LANOLIN ALCOHOL/MO/W.PET/CERES
100 CREAM (GRAM) TOPICAL DAILY
COMMUNITY

## 2024-05-02 RX ORDER — CETIRIZINE HYDROCHLORIDE 10 MG/1
10 TABLET ORAL DAILY
COMMUNITY

## 2024-05-02 RX ORDER — ZINC GLUCONATE 50 MG
50 TABLET ORAL DAILY
COMMUNITY

## 2024-05-02 RX ORDER — CHOLECALCIFEROL (VITAMIN D3) 50 MCG
TABLET ORAL DAILY
COMMUNITY

## 2024-05-02 RX ORDER — ASCORBIC ACID 500 MG
500 TABLET ORAL DAILY
COMMUNITY

## 2024-05-02 ASSESSMENT — ROUTINE ASSESSMENT OF PATIENT INDEX DATA (RAPID3)
PSYCHOLOGICAL DISTRESS SCORE: 0
PATIENT GLOBAL ASSESSMENT SCORE: 0
FATIGUE SCORE: 0
TOTAL RAPID3 SCORE: 0
PAIN SCORE: 0
MDHAQ FUNCTION SCORE: 0

## 2024-05-02 NOTE — PROGRESS NOTES
Subjective:          Chief Complaint: Nesha Felix is a 69 y.o. female who had concerns including Disease Management.    HPI:    Patient is a 68 y/o female here for f/u UCTD and Raynaud's.   She Describes hx of swelling in her hands with 15-30 minutes stiffness. She notes arthralgias and myalgias that is diffuse, she notes fatigue.   She notes Raynauds mild.    She did have serologies and repeat with direct ASHLEY +, SSA +, SSB negative, Smith negative and RNP negative.   \TPO negative/Thyroglobulin ab negative  Having itching for at least 3 years. Did not see Derm but Hydroxyzine with somnolence.   Recently did have repeat ASHLEY with IF 1:160 speckled with negative ASHLEY profile on HCQ.   NO SICCA symptoms. +malar type rash.     Working with Dr. Dickson s/p venous stenting.   OFF LYRICA,   Some pain at the right GTB and legs returning recently, mild. No edema. Will be seeing Anita Dickson,     She stopped HCQ for few months and noted increased joint stiffness. Better on HCQ. Restarted.   Due for eye exam:   She seems to have waxing and waning aches last weekend   Vimovo for arthritis using PRN which does help  Affected joints: feet stiff hard to walk.   She has some days with entire leg that is painful. Hx of sciatic did have PT no real help. Right mostly. DDD on older MRI of the L-spine follows with Dr. Escobar at [facility].   She     She has hx of Bladder cancer with no recurrence. More recently found to have renal mass. Will be seeing MD Velazco 10/1st and 2nd/ 2017- monitoring for 1 year.    Patient deneis any hx of seizure, stroke, DVT, pericarditis, pleurisy, anemia, nephritis, leukopenia. Patient does not facial redness with slight photosensitivity (fatigue) no scarring rashes.   Dr. Bustamante-GI recent EUS with small lesion in stomach all benign    She is under significant stress,  with leukemia.   FmHx: sister with SLE (CL also my patient)    Component      Latest Ref Rng & Units 6/30/2017   Anti Sm Antibody       0.00 - 19.99 EU 0.44   Anti-Sm Interpretation      Negative Negative   Anti-SSA Antibody      0.00 - 19.99 EU 2.61   Anti-SSA Interpretation      Negative Negative   Anti-SSB Antibody      0.00 - 19.99 EU 0.41   Anti-SSB Interpretation      Negative Negative   ds DNA Ab      Negative 1:10 Negative 1:10   Anti Sm/RNP Antibody      0.00 - 19.99 EU 1.12   Anti-Sm/RNP Interpretation      Negative Negative   CRP      0.0 - 8.2 mg/L 7.5   ASHLEY HEP-2 Titer       Positive 1:160 Speckled       REVIEW OF SYSTEMS:    Review of Systems   Constitutional:  Positive for malaise/fatigue. Negative for fever and weight loss.   HENT:  Negative for sore throat.    Eyes:  Negative for double vision, photophobia and redness.   Respiratory:  Negative for cough, shortness of breath and wheezing.    Cardiovascular:  Positive for leg swelling. Negative for chest pain, palpitations and orthopnea.   Gastrointestinal:  Negative for abdominal pain, constipation and diarrhea.   Genitourinary:  Negative for dysuria, hematuria and urgency.   Musculoskeletal:  Positive for joint pain and myalgias. Negative for back pain.   Skin:  Negative for rash.   Neurological:  Negative for dizziness, tingling, focal weakness and headaches.   Endo/Heme/Allergies:  Does not bruise/bleed easily.   Psychiatric/Behavioral:  Negative for depression, hallucinations and suicidal ideas.                Objective:            Past Medical History:   Diagnosis Date    Acid reflux     Cancer 2012     bladder; surgery 4/2012 most recent check 4/2016 clear.     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    Rheumatoid arthritis     rheumatoid     Thyroid disease      Family History   Problem Relation Name Age of Onset    Cancer Mother      Heart disease Father      No Known Problems Sister      No Known Problems Sister      No Known Problems Sister      Osteoarthritis Sister      Lupus Sister      No Known Problems Brother      Cancer  Maternal Aunt      Heart disease Maternal Aunt      Rheum arthritis Paternal Aunt      No Known Problems Daughter      No Known Problems Son      Glaucoma Neg Hx       Social History     Tobacco Use    Smoking status: Never    Smokeless tobacco: Never   Substance Use Topics    Drug use: No         Current Outpatient Medications on File Prior to Visit   Medication Sig Dispense Refill    ascorbic acid, vitamin C, (VITAMIN C) 500 MG tablet Take 500 mg by mouth once daily.      brompheniramine/phenylephrine (DIMAPHEN, PE, ORAL) Take by mouth.      bumetanide (BUMEX) 1 MG tablet Take 1 mg by mouth once daily.       cetirizine (ZYRTEC) 10 MG tablet Take 10 mg by mouth once daily.      cholecalciferol, vitamin D3, (VITAMIN D3) 50 mcg (2,000 unit) Tab Take by mouth once daily.      clopidogreL (PLAVIX) 75 mg tablet Take 75 mg by mouth once daily.      cyanocobalamin (VITAMIN B-12) 1000 MCG tablet Take 100 mcg by mouth once daily.      hydroxychloroquine (PLAQUENIL) 200 mg tablet Take 1 tablet (200 mg total) by mouth 2 (two) times daily. 180 tablet 3    levothyroxine (SYNTHROID) 25 MCG tablet Take 50 mcg by mouth before breakfast.      metoprolol succinate (TOPROL-XL) 25 MG 24 hr tablet       pantoprazole (PROTONIX) 40 MG tablet Take 40 mg by mouth once daily.      PREGNENOLONE, BULK, MISC by Misc.(Non-Drug; Combo Route) route.      rosuvastatin (CRESTOR) 20 MG tablet Take 20 mg by mouth every evening.       RYBELSUS 7 mg tablet Take 7 mg by mouth once daily.      spironolactone (ALDACTONE) 50 MG tablet TK 1 T PO D  3    traZODone (DESYREL) 50 MG tablet Take 50 mg by mouth every evening.      UNABLE TO FIND medication name: Adrenotone      zinc gluconate 50 mg tablet Take 50 mg by mouth once daily.      aspirin (ECOTRIN) 81 MG EC tablet Take 81 mg by mouth once daily. (Patient not taking: Reported on 5/2/2024)      metFORMIN (GLUCOPHAGE) 850 MG tablet Take 850 mg by mouth 2 (two) times daily with meals. (Patient not taking:  Reported on 5/2/2024)      predniSONE (DELTASONE) 10 MG tablet Take 40mg x2 days, 30 mg x2 days, 20mg x2 days, 10mg x2 days. (Patient not taking: Reported on 5/2/2024) 20 tablet 0    sulfamethoxazole-trimethoprim 800-160mg (BACTRIM DS) 800-160 mg Tab Take 1 tablet by mouth 2 (two) times daily. (Patient not taking: Reported on 5/2/2024) 14 tablet 0    TURMERIC ORAL Take 1,500 mg by mouth once daily. (Patient not taking: Reported on 5/2/2024)       Current Facility-Administered Medications on File Prior to Visit   Medication Dose Route Frequency Provider Last Rate Last Admin    EPINEPHrine (EPIPEN) 0.3 mg/0.3 mL pen injection 0.3 mg  0.3 mg Intramuscular PRN Xiomara Ma MD           Vitals:    05/02/24 1426   BP: 114/71   Pulse: 71       Physical Exam:    Physical Exam  Constitutional:       Appearance: She is well-developed.   HENT:      Head: Normocephalic and atraumatic.   Eyes:      General: Lids are normal.      Pupils: Pupils are equal, round, and reactive to light.   Cardiovascular:      Rate and Rhythm: Normal rate and regular rhythm.      Heart sounds: Normal heart sounds.   Pulmonary:      Effort: Pulmonary effort is normal.      Breath sounds: Normal breath sounds.   Musculoskeletal:      Right shoulder: No swelling or tenderness. Normal range of motion.      Left shoulder: No swelling or tenderness. Normal range of motion.      Right elbow: No swelling. Normal range of motion. No tenderness.      Left elbow: No swelling. Normal range of motion. No tenderness.      Right wrist: No swelling or tenderness. Normal range of motion.      Left wrist: No swelling or tenderness. Normal range of motion.      Right hand: No swelling or tenderness. Normal range of motion.      Left hand: No swelling or tenderness. Normal range of motion.      Cervical back: Normal range of motion.      Right knee: No swelling. Normal range of motion. No tenderness.      Left knee: No swelling. Normal range of motion. No  tenderness.      Right foot: Normal range of motion. No swelling or tenderness.      Left foot: Normal range of motion. No swelling or tenderness.   Skin:     General: Skin is warm and dry.   Neurological:      Mental Status: She is alert and oriented to person, place, and time.   Psychiatric:         Behavior: Behavior normal.         Thought Content: Thought content normal.           Assessment:       Encounter Diagnoses   Name Primary?    Undifferentiated connective tissue disease Yes    High risk medications (not anticoagulants) long-term use            Plan:        Undifferentiated connective tissue disease    High risk medications (not anticoagulants) long-term use        Most consistent with UCTD:   -No significant SICCA to suggest Sjogrens, UCTD she has no evidence of synovitis on exam.   -HCQ 200mg daily.  I see some changes in her skin around forehead and given weight loss I am going to decrease to HCQ 200mg ONCE daily.    -last eye exam:      Chronic leg pain: found to have bilateral common femoral narrowing with recent stenting Dr. Dickson. Some return of aches and pains, still better. Will be 5 years since stenting having update scans soon.     Renal mass followed in [place] and stable.       No follow-ups on file.           30min consultation with greater than 50% of that time included Preparing to see the patient (review records, tests), Obtaining and/or reviewing separately obtained historical data, Performing a medically appropriate examination and/or evaluation , Ordering medications, tests, and/or procedures, Referring and communicating with other healthcare professionals , Documenting clinical information in the electronic or other health record and Independently interpreting results  (as warranted) & communicating results to the patient/family/caregiver. All questions answered.

## 2024-11-01 ENCOUNTER — LAB VISIT (OUTPATIENT)
Dept: LAB | Facility: HOSPITAL | Age: 69
End: 2024-11-01
Attending: INTERNAL MEDICINE
Payer: MEDICARE

## 2024-11-01 DIAGNOSIS — E07.9 DISEASE OF THYROID GLAND: ICD-10-CM

## 2024-11-01 DIAGNOSIS — R73.9 BLOOD GLUCOSE ELEVATED: ICD-10-CM

## 2024-11-01 DIAGNOSIS — E34.9 ENDOCRINE DISORDER RELATED TO PUBERTY: ICD-10-CM

## 2024-11-01 DIAGNOSIS — I10 ESSENTIAL HYPERTENSION, MALIGNANT: ICD-10-CM

## 2024-11-01 DIAGNOSIS — M35.9 UNDIFFERENTIATED CONNECTIVE TISSUE DISEASE: ICD-10-CM

## 2024-11-01 DIAGNOSIS — M06.9 RHEUMATOID ARTHRITIS, INVOLVING UNSPECIFIED SITE, UNSPECIFIED WHETHER RHEUMATOID FACTOR PRESENT: ICD-10-CM

## 2024-11-01 DIAGNOSIS — Z79.899 LONG-TERM USE OF PLAQUENIL: ICD-10-CM

## 2024-11-01 LAB
ALBUMIN SERPL BCP-MCNC: 4.1 G/DL (ref 3.5–5.2)
ALBUMIN SERPL BCP-MCNC: 4.1 G/DL (ref 3.5–5.2)
ALP SERPL-CCNC: 69 U/L (ref 40–150)
ALP SERPL-CCNC: 69 U/L (ref 40–150)
ALT SERPL W/O P-5'-P-CCNC: 17 U/L (ref 10–44)
ALT SERPL W/O P-5'-P-CCNC: 17 U/L (ref 10–44)
ANION GAP SERPL CALC-SCNC: 9 MMOL/L (ref 8–16)
ANION GAP SERPL CALC-SCNC: 9 MMOL/L (ref 8–16)
AST SERPL-CCNC: 27 U/L (ref 10–40)
AST SERPL-CCNC: 27 U/L (ref 10–40)
BILIRUB SERPL-MCNC: 0.3 MG/DL (ref 0.1–1)
BILIRUB SERPL-MCNC: 0.3 MG/DL (ref 0.1–1)
BUN SERPL-MCNC: 21 MG/DL (ref 8–23)
BUN SERPL-MCNC: 21 MG/DL (ref 8–23)
CALCIUM SERPL-MCNC: 9.5 MG/DL (ref 8.7–10.5)
CALCIUM SERPL-MCNC: 9.5 MG/DL (ref 8.7–10.5)
CHLORIDE SERPL-SCNC: 102 MMOL/L (ref 95–110)
CHLORIDE SERPL-SCNC: 102 MMOL/L (ref 95–110)
CO2 SERPL-SCNC: 28 MMOL/L (ref 23–29)
CO2 SERPL-SCNC: 28 MMOL/L (ref 23–29)
CREAT SERPL-MCNC: 1 MG/DL (ref 0.5–1.4)
CREAT SERPL-MCNC: 1 MG/DL (ref 0.5–1.4)
CRP SERPL-MCNC: 2.4 MG/L (ref 0–8.2)
ERYTHROCYTE [SEDIMENTATION RATE] IN BLOOD BY PHOTOMETRIC METHOD: 14 MM/HR (ref 0–36)
EST. GFR  (NO RACE VARIABLE): >60 ML/MIN/1.73 M^2
EST. GFR  (NO RACE VARIABLE): >60 ML/MIN/1.73 M^2
ESTIMATED AVG GLUCOSE: 100 MG/DL (ref 68–131)
GLUCOSE SERPL-MCNC: 86 MG/DL (ref 70–110)
GLUCOSE SERPL-MCNC: 86 MG/DL (ref 70–110)
HBA1C MFR BLD: 5.1 % (ref 4–5.6)
POTASSIUM SERPL-SCNC: 4.2 MMOL/L (ref 3.5–5.1)
POTASSIUM SERPL-SCNC: 4.2 MMOL/L (ref 3.5–5.1)
PROT SERPL-MCNC: 7.1 G/DL (ref 6–8.4)
PROT SERPL-MCNC: 7.1 G/DL (ref 6–8.4)
SODIUM SERPL-SCNC: 139 MMOL/L (ref 136–145)
SODIUM SERPL-SCNC: 139 MMOL/L (ref 136–145)
T3FREE SERPL-MCNC: 2.4 PG/ML (ref 2.3–4.2)
T4 FREE SERPL-MCNC: 1.06 NG/DL (ref 0.71–1.51)
TSH SERPL DL<=0.005 MIU/L-ACNC: 0.5 UIU/ML (ref 0.4–4)

## 2024-11-01 PROCEDURE — 84443 ASSAY THYROID STIM HORMONE: CPT | Performed by: INTERNAL MEDICINE

## 2024-11-01 PROCEDURE — 83036 HEMOGLOBIN GLYCOSYLATED A1C: CPT | Performed by: INTERNAL MEDICINE

## 2024-11-01 PROCEDURE — 80053 COMPREHEN METABOLIC PANEL: CPT | Performed by: INTERNAL MEDICINE

## 2024-11-01 PROCEDURE — 84439 ASSAY OF FREE THYROXINE: CPT | Performed by: INTERNAL MEDICINE

## 2024-11-01 PROCEDURE — 84481 FREE ASSAY (FT-3): CPT | Performed by: INTERNAL MEDICINE

## 2024-11-01 PROCEDURE — 85651 RBC SED RATE NONAUTOMATED: CPT | Mod: PO | Performed by: INTERNAL MEDICINE

## 2024-11-01 PROCEDURE — 85025 COMPLETE CBC W/AUTO DIFF WBC: CPT | Performed by: INTERNAL MEDICINE

## 2024-11-01 PROCEDURE — 86140 C-REACTIVE PROTEIN: CPT | Performed by: INTERNAL MEDICINE

## 2024-11-02 LAB
BASOPHILS # BLD AUTO: 0.02 K/UL (ref 0–0.2)
BASOPHILS NFR BLD: 0.3 % (ref 0–1.9)
DIFFERENTIAL METHOD BLD: ABNORMAL
EOSINOPHIL # BLD AUTO: 0.1 K/UL (ref 0–0.5)
EOSINOPHIL NFR BLD: 1.6 % (ref 0–8)
ERYTHROCYTE [DISTWIDTH] IN BLOOD BY AUTOMATED COUNT: 13.6 % (ref 11.5–14.5)
HCT VFR BLD AUTO: 37.1 % (ref 37–48.5)
HGB BLD-MCNC: 11.9 G/DL (ref 12–16)
IMM GRANULOCYTES # BLD AUTO: 0.02 K/UL (ref 0–0.04)
IMM GRANULOCYTES NFR BLD AUTO: 0.3 % (ref 0–0.5)
LYMPHOCYTES # BLD AUTO: 1.4 K/UL (ref 1–4.8)
LYMPHOCYTES NFR BLD: 22.2 % (ref 18–48)
MCH RBC QN AUTO: 30.7 PG (ref 27–31)
MCHC RBC AUTO-ENTMCNC: 32.1 G/DL (ref 32–36)
MCV RBC AUTO: 96 FL (ref 82–98)
MONOCYTES # BLD AUTO: 0.6 K/UL (ref 0.3–1)
MONOCYTES NFR BLD: 9.6 % (ref 4–15)
NEUTROPHILS # BLD AUTO: 4.1 K/UL (ref 1.8–7.7)
NEUTROPHILS NFR BLD: 66 % (ref 38–73)
NRBC BLD-RTO: 0 /100 WBC
PLATELET # BLD AUTO: 284 K/UL (ref 150–450)
PMV BLD AUTO: 10.8 FL (ref 9.2–12.9)
RBC # BLD AUTO: 3.87 M/UL (ref 4–5.4)
WBC # BLD AUTO: 6.26 K/UL (ref 3.9–12.7)

## 2024-11-13 ENCOUNTER — OFFICE VISIT (OUTPATIENT)
Dept: RHEUMATOLOGY | Facility: CLINIC | Age: 69
End: 2024-11-13
Payer: MEDICARE

## 2024-11-13 VITALS
DIASTOLIC BLOOD PRESSURE: 71 MMHG | SYSTOLIC BLOOD PRESSURE: 117 MMHG | HEART RATE: 70 BPM | HEIGHT: 61 IN | BODY MASS INDEX: 30.26 KG/M2 | WEIGHT: 160.25 LBS

## 2024-11-13 DIAGNOSIS — M35.9 UNDIFFERENTIATED CONNECTIVE TISSUE DISEASE: Primary | ICD-10-CM

## 2024-11-13 DIAGNOSIS — Z79.899 LONG-TERM USE OF PLAQUENIL: ICD-10-CM

## 2024-11-13 PROCEDURE — 99999 PR PBB SHADOW E&M-EST. PATIENT-LVL IV: CPT | Mod: PBBFAC,,, | Performed by: INTERNAL MEDICINE

## 2024-11-13 PROCEDURE — 99214 OFFICE O/P EST MOD 30 MIN: CPT | Mod: PBBFAC,PO | Performed by: INTERNAL MEDICINE

## 2024-11-13 PROCEDURE — 99214 OFFICE O/P EST MOD 30 MIN: CPT | Mod: S$PBB,,, | Performed by: INTERNAL MEDICINE

## 2024-11-13 ASSESSMENT — ROUTINE ASSESSMENT OF PATIENT INDEX DATA (RAPID3)
MDHAQ FUNCTION SCORE: 0
TOTAL RAPID3 SCORE: 0
PAIN SCORE: 0
FATIGUE SCORE: 0
PATIENT GLOBAL ASSESSMENT SCORE: 0
PSYCHOLOGICAL DISTRESS SCORE: 0

## 2024-11-13 NOTE — PROGRESS NOTES
Subjective:          Chief Complaint: Nesha Felix is a 69 y.o. female who had concerns including Disease Management.    HPI:    Patient is a 68 y/o female here for f/u UCTD and Raynaud's.   She Describes hx of swelling in her hands with 15-30 minutes stiffness. She notes arthralgias and myalgias that is diffuse, she notes fatigue.   She notes Raynauds mild.    She did have serologies and repeat with direct ASHLEY +, SSA +, SSB negative, Smith negative and RNP negative.   \TPO negative/Thyroglobulin ab negative  Having itching for at least 3 years. Did not see Derm but Hydroxyzine with somnolence.   Recently did have repeat ASHLEY with IF 1:160 speckled with negative ASHLEY profile on HCQ.   NO SICCA symptoms. +malar type rash.     Working with Dr. Dickson s/p venous stenting.   OFF LYRICA,   Some pain at the right GTB and legs returning recently, mild. No edema. Will be seeing Anita Dickson,     She stopped HCQ for few months and noted increased joint stiffness. Better on HCQ. Restarted.   Due for eye exam:   She seems to have waxing and waning aches last weekend   Vimovo for arthritis using PRN which does help  Affected joints: feet stiff hard to walk.   She has some days with entire leg that is painful. Hx of sciatic did have PT no real help. Right mostly. DDD on older MRI of the L-spine follows with Dr. Escobar at [facility].   She     She has hx of Bladder cancer with no recurrence. More recently found to have renal mass. Will be seeing MD Velazco 10/1st and 2nd/ 2017- monitoring for 1 year.    Patient deneis any hx of seizure, stroke, DVT, pericarditis, pleurisy, anemia, nephritis, leukopenia. Patient does not facial redness with slight photosensitivity (fatigue) no scarring rashes.   Dr. Bustamante-GI recent EUS with small lesion in stomach all benign    She is under significant stress,  with leukemia.   FmHx: sister with SLE (CL also my patient)    Component      Latest Ref Rng & Units 6/30/2017   Anti Sm Antibody       0.00 - 19.99 EU 0.44   Anti-Sm Interpretation      Negative Negative   Anti-SSA Antibody      0.00 - 19.99 EU 2.61   Anti-SSA Interpretation      Negative Negative   Anti-SSB Antibody      0.00 - 19.99 EU 0.41   Anti-SSB Interpretation      Negative Negative   ds DNA Ab      Negative 1:10 Negative 1:10   Anti Sm/RNP Antibody      0.00 - 19.99 EU 1.12   Anti-Sm/RNP Interpretation      Negative Negative   CRP      0.0 - 8.2 mg/L 7.5   ASHLEY HEP-2 Titer       Positive 1:160 Speckled       REVIEW OF SYSTEMS:    Review of Systems   Constitutional:  Positive for malaise/fatigue. Negative for fever and weight loss.   HENT:  Negative for sore throat.    Eyes:  Negative for double vision, photophobia and redness.   Respiratory:  Negative for cough, shortness of breath and wheezing.    Cardiovascular:  Positive for leg swelling. Negative for chest pain, palpitations and orthopnea.   Gastrointestinal:  Negative for abdominal pain, constipation and diarrhea.   Genitourinary:  Negative for dysuria, hematuria and urgency.   Musculoskeletal:  Positive for joint pain and myalgias. Negative for back pain.   Skin:  Negative for rash.   Neurological:  Negative for dizziness, tingling, focal weakness and headaches.   Endo/Heme/Allergies:  Does not bruise/bleed easily.   Psychiatric/Behavioral:  Negative for depression, hallucinations and suicidal ideas.                Objective:            Past Medical History:   Diagnosis Date    Acid reflux     Cancer 2012     bladder; surgery 4/2012 most recent check 4/2016 clear.     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    Rheumatoid arthritis     rheumatoid     Thyroid disease      Family History   Problem Relation Name Age of Onset    Cancer Mother      Heart disease Father      No Known Problems Sister      No Known Problems Sister      No Known Problems Sister      Osteoarthritis Sister      Lupus Sister      No Known Problems Brother      Cancer  Maternal Aunt      Heart disease Maternal Aunt      Rheum arthritis Paternal Aunt      No Known Problems Daughter      No Known Problems Son      Glaucoma Neg Hx       Social History     Tobacco Use    Smoking status: Never    Smokeless tobacco: Never   Substance Use Topics    Drug use: No         Current Outpatient Medications on File Prior to Visit   Medication Sig Dispense Refill    ascorbic acid, vitamin C, (VITAMIN C) 500 MG tablet Take 500 mg by mouth once daily.      brompheniramine/phenylephrine (DIMAPHEN, PE, ORAL) Take by mouth.      bumetanide (BUMEX) 1 MG tablet Take 1 mg by mouth once daily.       cetirizine (ZYRTEC) 10 MG tablet Take 10 mg by mouth once daily.      cholecalciferol, vitamin D3, (VITAMIN D3) 50 mcg (2,000 unit) Tab Take by mouth once daily.      clopidogreL (PLAVIX) 75 mg tablet Take 75 mg by mouth once daily.      cyanocobalamin (VITAMIN B-12) 1000 MCG tablet Take 100 mcg by mouth once daily.      hydroxychloroquine (PLAQUENIL) 200 mg tablet Take 1 tablet (200 mg total) by mouth 2 (two) times daily. 180 tablet 3    levothyroxine (SYNTHROID) 25 MCG tablet Take 50 mcg by mouth before breakfast.      metoprolol succinate (TOPROL-XL) 25 MG 24 hr tablet       pantoprazole (PROTONIX) 40 MG tablet Take 40 mg by mouth once daily.      PREGNENOLONE, BULK, MISC by Misc.(Non-Drug; Combo Route) route.      rosuvastatin (CRESTOR) 20 MG tablet Take 20 mg by mouth every evening.       RYBELSUS 7 mg tablet Take 7 mg by mouth once daily.      spironolactone (ALDACTONE) 50 MG tablet TK 1 T PO D  3    traZODone (DESYREL) 50 MG tablet Take 50 mg by mouth every evening.      UNABLE TO FIND medication name: Adrenotone      zinc gluconate 50 mg tablet Take 50 mg by mouth once daily.      aspirin (ECOTRIN) 81 MG EC tablet Take 81 mg by mouth once daily. (Patient not taking: Reported on 11/13/2024)      metFORMIN (GLUCOPHAGE) 850 MG tablet Take 850 mg by mouth 2 (two) times daily with meals. (Patient not  taking: Reported on 11/13/2024)      predniSONE (DELTASONE) 10 MG tablet Take 40mg x2 days, 30 mg x2 days, 20mg x2 days, 10mg x2 days. (Patient not taking: Reported on 11/13/2024) 20 tablet 0    sulfamethoxazole-trimethoprim 800-160mg (BACTRIM DS) 800-160 mg Tab Take 1 tablet by mouth 2 (two) times daily. (Patient not taking: Reported on 11/13/2024) 14 tablet 0    TURMERIC ORAL Take 1,500 mg by mouth once daily. (Patient not taking: Reported on 11/13/2024)       Current Facility-Administered Medications on File Prior to Visit   Medication Dose Route Frequency Provider Last Rate Last Admin    EPINEPHrine (EPIPEN) 0.3 mg/0.3 mL pen injection 0.3 mg  0.3 mg Intramuscular PRN Xiomara Ma MD           Vitals:    11/13/24 1558   BP: 117/71   Pulse: 70       Physical Exam:    Physical Exam  Constitutional:       Appearance: She is well-developed.   HENT:      Head: Normocephalic and atraumatic.   Eyes:      General: Lids are normal.      Pupils: Pupils are equal, round, and reactive to light.   Cardiovascular:      Rate and Rhythm: Normal rate and regular rhythm.      Heart sounds: Normal heart sounds.   Pulmonary:      Effort: Pulmonary effort is normal.      Breath sounds: Normal breath sounds.   Musculoskeletal:      Right shoulder: No swelling or tenderness. Normal range of motion.      Left shoulder: No swelling or tenderness. Normal range of motion.      Right elbow: No swelling. Normal range of motion. No tenderness.      Left elbow: No swelling. Normal range of motion. No tenderness.      Right wrist: No swelling or tenderness. Normal range of motion.      Left wrist: No swelling or tenderness. Normal range of motion.      Right hand: No swelling or tenderness. Normal range of motion.      Left hand: No swelling or tenderness. Normal range of motion.      Cervical back: Normal range of motion.      Right knee: No swelling. Normal range of motion. No tenderness.      Left knee: No swelling. Normal range  of motion. No tenderness.      Right foot: Normal range of motion. No swelling or tenderness.      Left foot: Normal range of motion. No swelling or tenderness.   Skin:     General: Skin is warm and dry.   Neurological:      Mental Status: She is alert and oriented to person, place, and time.   Psychiatric:         Behavior: Behavior normal.         Thought Content: Thought content normal.           Assessment:       Encounter Diagnoses   Name Primary?    Undifferentiated connective tissue disease Yes    Long-term use of Plaquenil          Plan:        Undifferentiated connective tissue disease  -     CBC Auto Differential; Future; Expected date: 11/13/2024  -     C-Reactive Protein; Future; Expected date: 11/13/2024  -     Creatinine, Serum; Future; Expected date: 11/13/2024  -     Sedimentation rate; Future; Expected date: 11/13/2024    Long-term use of Plaquenil      Most consistent with UCTD:   -No significant SICCA to suggest Sjogrens, UCTD she has no evidence of synovitis on exam.   -HCQ 200mg daily.  I see some changes in her skin around forehead and given weight loss I am going to decrease to HCQ 200mg ONCE daily.    -last eye exam:      Chronic leg pain: found to have bilateral common femoral narrowing with recent stenting Dr. Dickson. Some return of aches and pains, still better. Will be 5 years since stenting having update scans soon.     Renal mass followed in [place] and stable.     No follow-ups on file.           30min consultation with greater than 50% of that time included Preparing to see the patient (review records, tests), Obtaining and/or reviewing separately obtained historical data, Performing a medically appropriate examination and/or evaluation , Ordering medications, tests, and/or procedures, Referring and communicating with other healthcare professionals , Documenting clinical information in the electronic or other health record and Independently interpreting results  (as warranted) &  communicating results to the patient/family/caregiver. All questions answered.

## 2024-11-19 ENCOUNTER — PATIENT MESSAGE (OUTPATIENT)
Dept: GASTROENTEROLOGY | Facility: CLINIC | Age: 69
End: 2024-11-19
Payer: MEDICARE

## 2025-04-16 NOTE — TELEPHONE ENCOUNTER
----- Message from Phani Garvey DPM sent at 5/3/2017  7:07 AM CDT -----  Hey Ladies,    Please call Mrs. Felix and inform that fungal cultures were completely devoid of fungal infection.  Thanks!    Dr. Garvey  
Attempted to call patient, Left message.  
Patient informed verbalized understanding.  
Patient was unhappy with results of fungal culture. Feels some other test should be ran to explain discoloration of great toes. I advised nail problem was most likely due to nail trauma. Patient disagreed  requesting  that I speak with Dr. Garvey regarding additional testing.  
Per Dr. Garvey. No further testing is required at this time. Nail discoloration is due to nail trauma. Dramatic injury is not necessarily the cause. It could just come from pressure to the nail bed. Trim and file nails to control thickness.    
no